# Patient Record
Sex: MALE | Race: WHITE
[De-identification: names, ages, dates, MRNs, and addresses within clinical notes are randomized per-mention and may not be internally consistent; named-entity substitution may affect disease eponyms.]

---

## 2022-07-07 ENCOUNTER — HOSPITAL ENCOUNTER (INPATIENT)
Dept: HOSPITAL 47 - EC | Age: 62
LOS: 8 days | Discharge: HOME | DRG: 834 | End: 2022-07-15
Attending: HOSPITALIST | Admitting: HOSPITALIST
Payer: MEDICARE

## 2022-07-07 VITALS — BODY MASS INDEX: 37.3 KG/M2

## 2022-07-07 DIAGNOSIS — R04.0: ICD-10-CM

## 2022-07-07 DIAGNOSIS — Z79.01: ICD-10-CM

## 2022-07-07 DIAGNOSIS — Z91.81: ICD-10-CM

## 2022-07-07 DIAGNOSIS — Z79.891: ICD-10-CM

## 2022-07-07 DIAGNOSIS — I10: ICD-10-CM

## 2022-07-07 DIAGNOSIS — Z20.822: ICD-10-CM

## 2022-07-07 DIAGNOSIS — Z88.0: ICD-10-CM

## 2022-07-07 DIAGNOSIS — R55: ICD-10-CM

## 2022-07-07 DIAGNOSIS — J44.1: ICD-10-CM

## 2022-07-07 DIAGNOSIS — D51.9: ICD-10-CM

## 2022-07-07 DIAGNOSIS — Z82.0: ICD-10-CM
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
## 2022-07-07 DIAGNOSIS — Z88.8: ICD-10-CM

## 2022-07-07 DIAGNOSIS — R07.89: ICD-10-CM

## 2022-07-07 DIAGNOSIS — R19.5: ICD-10-CM

## 2022-07-07 DIAGNOSIS — Z82.49: ICD-10-CM

## 2022-07-07 DIAGNOSIS — Z86.718: ICD-10-CM

## 2022-07-07 DIAGNOSIS — D75.89: ICD-10-CM

## 2022-07-07 DIAGNOSIS — Z98.1: ICD-10-CM
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
## 2022-07-07 DIAGNOSIS — R60.0: ICD-10-CM

## 2022-07-07 DIAGNOSIS — R53.1: ICD-10-CM

## 2022-07-07 DIAGNOSIS — I49.3: ICD-10-CM

## 2022-07-07 DIAGNOSIS — D53.9: ICD-10-CM

## 2022-07-07 DIAGNOSIS — Z88.6: ICD-10-CM

## 2022-07-07 DIAGNOSIS — I49.1: ICD-10-CM

## 2022-07-07 DIAGNOSIS — Z87.81: ICD-10-CM

## 2022-07-07 DIAGNOSIS — B18.2: ICD-10-CM

## 2022-07-07 DIAGNOSIS — E66.9: ICD-10-CM

## 2022-07-07 DIAGNOSIS — E87.70: ICD-10-CM

## 2022-07-07 DIAGNOSIS — M54.50: ICD-10-CM

## 2022-07-07 DIAGNOSIS — R20.0: ICD-10-CM

## 2022-07-07 DIAGNOSIS — G89.29: ICD-10-CM

## 2022-07-07 DIAGNOSIS — I87.8: ICD-10-CM

## 2022-07-07 DIAGNOSIS — I87.2: ICD-10-CM

## 2022-07-07 DIAGNOSIS — Z79.51: ICD-10-CM

## 2022-07-07 DIAGNOSIS — Z79.899: ICD-10-CM

## 2022-07-07 DIAGNOSIS — D61.818: ICD-10-CM

## 2022-07-07 DIAGNOSIS — C95.00: Primary | ICD-10-CM

## 2022-07-07 DIAGNOSIS — E86.0: ICD-10-CM

## 2022-07-07 DIAGNOSIS — E87.6: ICD-10-CM

## 2022-07-07 DIAGNOSIS — N17.9: ICD-10-CM

## 2022-07-07 DIAGNOSIS — F17.210: ICD-10-CM

## 2022-07-07 DIAGNOSIS — G93.41: ICD-10-CM

## 2022-07-07 DIAGNOSIS — S06.9X1A: ICD-10-CM

## 2022-07-07 DIAGNOSIS — Z95.828: ICD-10-CM

## 2022-07-07 DIAGNOSIS — Z78.1: ICD-10-CM

## 2022-07-07 DIAGNOSIS — K92.2: ICD-10-CM

## 2022-07-07 DIAGNOSIS — I26.99: ICD-10-CM

## 2022-07-07 DIAGNOSIS — K64.9: ICD-10-CM

## 2022-07-07 DIAGNOSIS — R32: ICD-10-CM

## 2022-07-07 DIAGNOSIS — K80.20: ICD-10-CM

## 2022-07-07 DIAGNOSIS — M50.323: ICD-10-CM

## 2022-07-07 DIAGNOSIS — G40.909: ICD-10-CM

## 2022-07-07 LAB
ALBUMIN SERPL-MCNC: 3.4 G/DL (ref 3.5–5)
ALP SERPL-CCNC: 82 U/L (ref 38–126)
ALT SERPL-CCNC: 67 U/L (ref 4–49)
ANION GAP SERPL CALC-SCNC: 4 MMOL/L
APTT BLD: 21.6 SEC (ref 22–30)
AST SERPL-CCNC: 73 U/L (ref 17–59)
BUN SERPL-SCNC: 39 MG/DL (ref 9–20)
CALCIUM SPEC-MCNC: 8.1 MG/DL (ref 8.4–10.2)
CHLORIDE SERPL-SCNC: 107 MMOL/L (ref 98–107)
CO2 SERPL-SCNC: 31 MMOL/L (ref 22–30)
ERYTHROCYTE [DISTWIDTH] IN BLOOD BY AUTOMATED COUNT: 2.23 M/UL (ref 4.3–5.9)
ERYTHROCYTE [DISTWIDTH] IN BLOOD BY AUTOMATED COUNT: 2.25 M/UL (ref 4.3–5.9)
ERYTHROCYTE [DISTWIDTH] IN BLOOD: 16.9 % (ref 11.5–15.5)
ERYTHROCYTE [DISTWIDTH] IN BLOOD: 17 % (ref 11.5–15.5)
GLUCOSE BLD-MCNC: 139 MG/DL (ref 70–110)
GLUCOSE SERPL-MCNC: 113 MG/DL (ref 74–99)
HCT VFR BLD AUTO: 25.9 % (ref 39–53)
HCT VFR BLD AUTO: 25.9 % (ref 39–53)
HGB BLD-MCNC: 8.4 GM/DL (ref 13–17.5)
HGB BLD-MCNC: 8.5 GM/DL (ref 13–17.5)
INR PPP: 1 (ref ?–1.2)
MAGNESIUM SPEC-SCNC: 2.2 MG/DL (ref 1.6–2.3)
MCH RBC QN AUTO: 37.5 PG (ref 25–35)
MCH RBC QN AUTO: 37.5 PG (ref 25–35)
MCHC RBC AUTO-ENTMCNC: 32.4 G/DL (ref 31–37)
MCHC RBC AUTO-ENTMCNC: 32.7 G/DL (ref 31–37)
MCV RBC AUTO: 114.8 FL (ref 80–100)
MCV RBC AUTO: 116 FL (ref 80–100)
POTASSIUM SERPL-SCNC: 2.8 MMOL/L (ref 3.5–5.1)
PROT SERPL-MCNC: 5.7 G/DL (ref 6.3–8.2)
PT BLD: 11.1 SEC (ref 9–12)
SODIUM SERPL-SCNC: 142 MMOL/L (ref 137–145)
WBC # BLD AUTO: 4.4 K/UL (ref 3.8–10.6)
WBC # BLD AUTO: 5 K/UL (ref 3.8–10.6)

## 2022-07-07 PROCEDURE — 71275 CT ANGIOGRAPHY CHEST: CPT

## 2022-07-07 PROCEDURE — 86431 RHEUMATOID FACTOR QUANT: CPT

## 2022-07-07 PROCEDURE — 85384 FIBRINOGEN ACTIVITY: CPT

## 2022-07-07 PROCEDURE — 85610 PROTHROMBIN TIME: CPT

## 2022-07-07 PROCEDURE — 70460 CT HEAD/BRAIN W/DYE: CPT

## 2022-07-07 PROCEDURE — 96366 THER/PROPH/DIAG IV INF ADDON: CPT

## 2022-07-07 PROCEDURE — 71046 X-RAY EXAM CHEST 2 VIEWS: CPT

## 2022-07-07 PROCEDURE — 87522 HEPATITIS C REVRS TRNSCRPJ: CPT

## 2022-07-07 PROCEDURE — 81001 URINALYSIS AUTO W/SCOPE: CPT

## 2022-07-07 PROCEDURE — 83550 IRON BINDING TEST: CPT

## 2022-07-07 PROCEDURE — 96372 THER/PROPH/DIAG INJ SC/IM: CPT

## 2022-07-07 PROCEDURE — 86038 ANTINUCLEAR ANTIBODIES: CPT

## 2022-07-07 PROCEDURE — 72125 CT NECK SPINE W/O DYE: CPT

## 2022-07-07 PROCEDURE — 93970 EXTREMITY STUDY: CPT

## 2022-07-07 PROCEDURE — 85025 COMPLETE CBC W/AUTO DIFF WBC: CPT

## 2022-07-07 PROCEDURE — 76705 ECHO EXAM OF ABDOMEN: CPT

## 2022-07-07 PROCEDURE — 83010 ASSAY OF HAPTOGLOBIN QUANT: CPT

## 2022-07-07 PROCEDURE — 94640 AIRWAY INHALATION TREATMENT: CPT

## 2022-07-07 PROCEDURE — 96376 TX/PRO/DX INJ SAME DRUG ADON: CPT

## 2022-07-07 PROCEDURE — 85045 AUTOMATED RETICULOCYTE COUNT: CPT

## 2022-07-07 PROCEDURE — 36415 COLL VENOUS BLD VENIPUNCTURE: CPT

## 2022-07-07 PROCEDURE — 83880 ASSAY OF NATRIURETIC PEPTIDE: CPT

## 2022-07-07 PROCEDURE — 99285 EMERGENCY DEPT VISIT HI MDM: CPT

## 2022-07-07 PROCEDURE — 94760 N-INVAS EAR/PLS OXIMETRY 1: CPT

## 2022-07-07 PROCEDURE — 96375 TX/PRO/DX INJ NEW DRUG ADDON: CPT

## 2022-07-07 PROCEDURE — 86334 IMMUNOFIX E-PHORESIS SERUM: CPT

## 2022-07-07 PROCEDURE — 95816 EEG AWAKE AND DROWSY: CPT

## 2022-07-07 PROCEDURE — 87521 HEPATITIS C PROBE&RVRS TRNSC: CPT

## 2022-07-07 PROCEDURE — 85730 THROMBOPLASTIN TIME PARTIAL: CPT

## 2022-07-07 PROCEDURE — 96361 HYDRATE IV INFUSION ADD-ON: CPT

## 2022-07-07 PROCEDURE — 83883 ASSAY NEPHELOMETRY NOT SPEC: CPT

## 2022-07-07 PROCEDURE — 82728 ASSAY OF FERRITIN: CPT

## 2022-07-07 PROCEDURE — 82784 ASSAY IGA/IGD/IGG/IGM EACH: CPT

## 2022-07-07 PROCEDURE — 93306 TTE W/DOPPLER COMPLETE: CPT

## 2022-07-07 PROCEDURE — 83540 ASSAY OF IRON: CPT

## 2022-07-07 PROCEDURE — 70450 CT HEAD/BRAIN W/O DYE: CPT

## 2022-07-07 PROCEDURE — 93005 ELECTROCARDIOGRAM TRACING: CPT

## 2022-07-07 PROCEDURE — 84443 ASSAY THYROID STIM HORMONE: CPT

## 2022-07-07 PROCEDURE — 84484 ASSAY OF TROPONIN QUANT: CPT

## 2022-07-07 PROCEDURE — 84165 PROTEIN E-PHORESIS SERUM: CPT

## 2022-07-07 PROCEDURE — 83921 ORGANIC ACID SINGLE QUANT: CPT

## 2022-07-07 PROCEDURE — 87902 NFCT AGT GNTYP ALYS HEP C: CPT

## 2022-07-07 PROCEDURE — 82746 ASSAY OF FOLIC ACID SERUM: CPT

## 2022-07-07 PROCEDURE — 71045 X-RAY EXAM CHEST 1 VIEW: CPT

## 2022-07-07 PROCEDURE — 83615 LACTATE (LD) (LDH) ENZYME: CPT

## 2022-07-07 PROCEDURE — 85379 FIBRIN DEGRADATION QUANT: CPT

## 2022-07-07 PROCEDURE — 36600 WITHDRAWAL OF ARTERIAL BLOOD: CPT

## 2022-07-07 PROCEDURE — 78227 HEPATOBIL SYST IMAGE W/DRUG: CPT

## 2022-07-07 PROCEDURE — 83735 ASSAY OF MAGNESIUM: CPT

## 2022-07-07 PROCEDURE — 87040 BLOOD CULTURE FOR BACTERIA: CPT

## 2022-07-07 PROCEDURE — 87635 SARS-COV-2 COVID-19 AMP PRB: CPT

## 2022-07-07 PROCEDURE — 82805 BLOOD GASES W/O2 SATURATION: CPT

## 2022-07-07 PROCEDURE — 38222 DX BONE MARROW BX & ASPIR: CPT

## 2022-07-07 PROCEDURE — 87390 HIV-1 AG IA: CPT

## 2022-07-07 PROCEDURE — 82747 ASSAY OF FOLIC ACID RBC: CPT

## 2022-07-07 PROCEDURE — 80053 COMPREHEN METABOLIC PANEL: CPT

## 2022-07-07 PROCEDURE — 82607 VITAMIN B-12: CPT

## 2022-07-07 PROCEDURE — 84550 ASSAY OF BLOOD/URIC ACID: CPT

## 2022-07-07 PROCEDURE — 80061 LIPID PANEL: CPT

## 2022-07-07 PROCEDURE — 96365 THER/PROPH/DIAG IV INF INIT: CPT

## 2022-07-07 PROCEDURE — 85652 RBC SED RATE AUTOMATED: CPT

## 2022-07-07 PROCEDURE — 82140 ASSAY OF AMMONIA: CPT

## 2022-07-07 PROCEDURE — 84100 ASSAY OF PHOSPHORUS: CPT

## 2022-07-07 RX ADMIN — POTASSIUM CHLORIDE SCH MLS/HR: 7.46 INJECTION, SOLUTION INTRAVENOUS at 17:40

## 2022-07-07 NOTE — CT
EXAMINATION TYPE: CT brain cspine wo con

 

DATE OF EXAM: 7/7/2022

 

COMPARISON:

 

HISTORY: Multiple symcope episodes. Pt states he hit head.

 

CT DLP: 1606.3 mGycm, Automated exposure control for dose reduction was used.

 

CONTRAST: Patient injected with mL of .

 

CT of the brain is performed utilizing 3 mm thick sections through the posterior fossa and 3 mm thick
 sections through the remaining calvarium.  

 

Study is performed within 24 hours of arrival to the hospital.

 

 No abnormal hyperdensity is present to suggest an acute intracranial hemorrhage.

No mass lesion is evident.

No acute infarcts are evident.

Ventricles and sulci are appropriate for the patient age.  

 

Paranasal sinuses and mastoid air cells within the field-of-view are clear. Right septal deviation is
 noted.

 

IMPRESSIONS:

1. No acute intracranial process. MRI could be performed as clinically indicated.

 

CT cervical spine.

 

COMPARISON: None

 

CT of the cervical spine is performed in the axial plane at 2 mm thick sections.  Reconstructed image
s in the coronal, and sagittal plane are reviewed on the computer. 

 

No acute fractures are evident. Follow-up can be performed as clinically indicated.

Vertebral body alignment is normal.

There is loss of disc height C3-4. Anterior cervical fusion is evident C4-C7. Anterior vertebral body
 spurring is present C7-T1 and C3.

Vertebral body heights are preserved.

No spinal canal stenosis is evident.

No neural foraminal stenosis is evident. 

 

IMPRESSIONS:

1. Degenerative disc changes with anterior cervical fusion.

2. No acute osseous abnormality.

## 2022-07-07 NOTE — CT
EXAMINATION TYPE: CT chest angio for PE

 

DATE OF EXAM: 7/7/2022

 

COMPARISON: None

 

HISTORY: Syncope, elevated troponin

 

CT DLP: 776.6 mGycm

 

CONTRAST: 

CT chest with contrast and 3D reconstruction with MIP imaging is performed with IV Contrast, patient 
injected with 80ml mL of Isovue 370.

 

Contrast-enhanced CT of the chest was performed through the course of the pulmonary arteries with crys
g and mediastinal window settings submitted.  3D reconstruction with MIP imaging was also performed.

 

PULMONARY ARTERIES: There are couple filling defects within 2 right upper lobe pulmonary arterial bra
nches seen best on image 68 of 175 for which I cannot exclude pulmonary embolism. There is also filli
ng defect involving the pulmonary arterial branch seen on image 59 of 175. The remaining pulmonary ar
teries demonstrate appropriate opacification with contrast.

 

LUNGS: There is some upper lobe groundglass opacity which may reflect the acute inflammatory process.
 No evidence for atelectasis.   No pulmonary nodule or mass is detected.  No pleural effusion.  

 

MEDIASTINUM:  Thoracic aorta is of normal caliber,. The heart is not enlarged.  No evidence for media
stinal mass.  No mediastinal lymph nodes greater than 1cm.

 

HILAR STRUCTURES: No evidence for mass.  No hilar lymph nodes greater than 1 cm.

 

UPPER ABDOMEN:  No significant abnormality is seen.

 

IMPRESSION:

1.  Small pulmonary emboli cannot be excluded.

2. Groundglass upper lobe density may reflect acute inflammatory process.

## 2022-07-07 NOTE — P.HPIM
History of Present Illness


H&P Date: 07/07/22





The patient is a 62-year-old male with a PMH of hypertension, chronic anemia, 

COPD, chronic lower extremity edema, history of DVT on Eliquis, who presents to 

the emergency room for multiple episodes of loss of consciousness.  The patient 

reports that he had been in his usual state of health until about 5 days ago 

when he began losing consciousness without any clear warning.  Reports it 

happened a total of 4 times in the past 5 days.  Each time, he would lose 

consciousness while sitting down or standing up, without any prodromal symptoms.

 He reports waking up on the floor each time, informed by bystanders that he was

confused and having urinary incontinence during 2 of these episodes.  He also 

reports biting his tongue during the episode earlier today.  Patient reports 

that today, he was with his friends in town and they had gone into a store, at 

which point he decided to go to the store as well, and as he got out of the car,

he was walking in the parking lot and the next thing he knows he was on the 

ground with people surrounding him.  He reports pain at the back of his head 

from the fall.  Does endorse experiencing some chest discomfort and shortness of

breath, left-sided, nonradiating shortly after regaining consciousness, lasting 

for a few minutes.  No reported shaking movements of the limbs.  He denies any 

prior history of such symptoms.  Reports that the symptoms are not positional, 

and he has no dizziness or lightheadedness upon standing.  Denies any changes in

his appetite recently.  Denied fever, chills, abdominal pain, diarrhea.  Reports

chronic nonproductive cough which he attributes to his COPD.  Head and cervical 

spine CT was unremarkable.  Chest CTA revealed groundglass upper lobe density, 

possible acute inflammatory process.  EKG revealed a junctional rhythm at 85 bpm

with poor R-wave progression.  Laboratory evaluation was remarkable for 

hemoglobin 8.5, .8, platelet count 74, d-dimer 3.9, potassium 2.8, CO2 

31, BUN 39, creatinine 1.24, troponin less than 0.012.





Review of systems:


Pertinent positives and negatives as discussed in HPI, a complete review of 

systems was performed and all other systems are negative.





Physical examination:


General: non toxic, no distress, appears at stated age, obese


Derm: no unusual rashes/lesions, warm


Head: atraumatic, normocephalic, symmetric


Eyes: EOMI, no lid lag, anicteric sclera, pupils equal round reactive to light


ENT: Nose and ears atraumatic


Neck: No cervical lymphadenopathy, trachea midline, supple


Mouth: no lip lesion, mucus membranes moist, bilateral tongue bites noted


Cardiovascular: S1S2 reg, no murmur, positive dorsalis pedis pulse bilateral, 2+

bilateral lower extremity pitting edema with right lower extremity chronic 

venous stasis changes


Lungs: Diffuse expiratory wheezing, no accessory muscle use


Abdominal: soft,  nontender to palpation, no guarding


Ext: muscle strength 5 out of 5 in all 4 extremities grossly, no gross muscle 

atrophy, no contractures, 


Neuro:  CN II-XI grossly intact, no gross focal neuro deficits


Psych: Alert, oriented, appropriate affect 





Assessment/plan





Syncope, seizure versus cardiogenic


-Seizure and fall precautions


-Obtain orthostatics


-Continue cardiac monitoring


-Neurology and cardiology consults


-Echocardiogram





Mild acute COPD exacerbation


-DuoNeb's


-Oral prednisone





Hypokalemia


-Replace and monitor





Macrocytic anemia


-Patient reports he has a history of anemia


-Check B12 and folate levels





Thrombocytopenia


-No baseline available for comparison





Acute kidney injury, prerenal


-Unclear etiology


-Judicious use of IV fluids in setting of significant lower extremity edema


-Monitor BMP





DVT prophylaxis


-Heparin subcu





The patient is admitted with an anticipated less than 2 midnight stay for 

evaluation of syncope.


CODE STATUS: Full Code


Discussed with: Patient


Anticipated discharge date: in am


Anticipated discharge place: Home











Past Medical History


Past Medical History: Blood Disorder, COPD, Hypertension


Additional Past Medical History / Comment(s): chronic back pain


History of Any Multi-Drug Resistant Organisms: None Reported


Past Surgical History: Back Surgery, Orthopedic Surgery


Past Anesthesia/Blood Transfusion Reactions: No Reported Reaction


Smoking Status: Current every day smoker


Past Alcohol Use History: None Reported


Past Drug Use History: None Reported





- Past Family History


  ** Mother


Family Medical History: Hypertension





Medications and Allergies


                                Home Medications











 Medication  Instructions  Recorded  Confirmed  Type


 


ALPRAZolam [Xanax] 1 mg PO TID 07/07/22 07/07/22 History


 


Albuterol Inhaler [Ventolin Hfa 2 puff INHALATION RT-QID PRN 07/07/22 07/07/22 

History





Inhaler]    


 


Apixaban [Eliquis] 5 mg PO BID 07/07/22 07/07/22 History


 


Baclofen 10 mg PO BID 07/07/22 07/07/22 History


 


Fluticasone/Umeclidin/Vilanter 1 puff INHALATION RT-DAILY 07/07/22 07/07/22 

History





[Trelegy Ellipta 100-62.5-25]    


 


Furosemide [Lasix] 20 mg PO HS 07/07/22 07/07/22 History


 


Furosemide [Lasix] 40 mg PO DAILY 07/07/22 07/07/22 History


 


Mirtazapine 30 mg PO HS 07/07/22 07/07/22 History


 


Montelukast [Singulair] 10 mg PO HS 07/07/22 07/07/22 History


 


Mupirocin 2% Oint [Bactroban 2% 1 applic TOPICAL DAILY 07/07/22 07/07/22 History





Oint]    


 


Naloxone HCl [Narcan] 4 mg NASAL ONCE PRN 07/07/22 07/07/22 History


 


Pantoprazole [Protonix] 40 mg PO DAILY 07/07/22 07/07/22 History


 


Potassium Chloride ER [K-Dur 20] 20 meq PO DAILY 07/07/22 07/07/22 History


 


Pregabalin [Lyrica] 150 mg PO BID 07/07/22 07/07/22 History


 


Promethazine HCl [Phenergan Syrup] 6.25 mg PO Q6H PRN 07/07/22 07/07/22 History


 


hydroCHLOROthiazide [Hydrodiuril] 25 mg PO DAILY 07/07/22 07/07/22 History


 


lisinopriL [Prinivil] 20 mg PO DAILY 07/07/22 07/07/22 History


 


oxyCODONE-APAP 10-325MG [Percocet 1 tab PO TID 07/07/22 07/07/22 History





 mg]    








                                    Allergies











Allergy/AdvReac Type Severity Reaction Status Date / Time


 


Penicillins Allergy  Anaphylaxis Verified 07/07/22 18:15


 


tamsulosin [From Flomax] Allergy  Rash/Hives Verified 07/07/22 17:40


 


ibuprofen AdvReac  Nausea Verified 07/07/22 17:40














Physical Exam


Vitals: 


                                   Vital Signs











  Temp Pulse Pulse Pulse Resp BP BP


 


 07/07/22 22:19  97.8 F   72  75  18   133/64


 


 07/07/22 19:20  97.8 F  78    18  139/72 


 


 07/07/22 17:09   75    20  137/71 


 


 07/07/22 16:11    81   18   124/90


 


 07/07/22 16:06   81    18  124/57 


 


 07/07/22 14:31   83  86   18  126/70 


 


 07/07/22 14:00  98.4 F  87    18  131/64 














  BP BP Pulse Ox


 


 07/07/22 22:19    98


 


 07/07/22 19:20    95


 


 07/07/22 17:09    98


 


 07/07/22 16:11  130/59  116/69 


 


 07/07/22 16:06   


 


 07/07/22 14:31    94 L


 


 07/07/22 14:00    95








                                Intake and Output











 07/07/22 07/07/22 07/07/22





 06:59 14:59 22:59


 


Other:   


 


  Weight  121.563 kg 121.563 kg














Results


CBC & Chem 7: 


                                 07/07/22 23:00





                                 07/07/22 15:58


Labs: 


                  Abnormal Lab Results - Last 24 Hours (Table)











  07/07/22 07/07/22 07/07/22 Range/Units





  14:48 14:48 15:58 


 


RBC  2.25 L    (4.30-5.90)  m/uL


 


Hgb  8.5 L    (13.0-17.5)  gm/dL


 


Hct  25.9 L    (39.0-53.0)  %


 


MCV  114.8 H    (80.0-100.0)  fL


 


MCH  37.5 H    (25.0-35.0)  pg


 


RDW  17.0 H    (11.5-15.5)  %


 


Plt Count  74 L    (150-450)  k/uL


 


Macrocytosis  Marked A    


 


APTT   21.6 L   (22.0-30.0)  sec


 


D-Dimer   3.90 H   (<0.60)  mg/L FEU


 


Potassium    2.8 L  (3.5-5.1)  mmol/L


 


Carbon Dioxide    31 H  (22-30)  mmol/L


 


BUN    39 H  (9-20)  mg/dL


 


Glucose    113 H  (74-99)  mg/dL


 


POC Glucose (mg/dL)     ()  mg/dL


 


Calcium    8.1 L  (8.4-10.2)  mg/dL


 


AST    73 H  (17-59)  U/L


 


ALT    67 H  (4-49)  U/L


 


Total Protein    5.7 L  (6.3-8.2)  g/dL


 


Albumin    3.4 L  (3.5-5.0)  g/dL














  07/07/22 Range/Units





  16:10 


 


RBC   (4.30-5.90)  m/uL


 


Hgb   (13.0-17.5)  gm/dL


 


Hct   (39.0-53.0)  %


 


MCV   (80.0-100.0)  fL


 


MCH   (25.0-35.0)  pg


 


RDW   (11.5-15.5)  %


 


Plt Count   (150-450)  k/uL


 


Macrocytosis   


 


APTT   (22.0-30.0)  sec


 


D-Dimer   (<0.60)  mg/L FEU


 


Potassium   (3.5-5.1)  mmol/L


 


Carbon Dioxide   (22-30)  mmol/L


 


BUN   (9-20)  mg/dL


 


Glucose   (74-99)  mg/dL


 


POC Glucose (mg/dL)  139 H  ()  mg/dL


 


Calcium   (8.4-10.2)  mg/dL


 


AST   (17-59)  U/L


 


ALT   (4-49)  U/L


 


Total Protein   (6.3-8.2)  g/dL


 


Albumin   (3.5-5.0)  g/dL














Thrombosis Risk Factor Assmnt





- Choose All That Apply


Each Factor Represents 1 point: Age 41-60 years, Obesity (BMI >25), Swollen legs

(current)


Thrombosis Risk Factor Assessment Total Risk Factor Score: 3


Thrombosis Risk Factor Assessment Level: Moderate Risk

## 2022-07-07 NOTE — XR
EXAMINATION TYPE: XR chest 2V

 

DATE OF EXAM: 7/7/2022

 

COMPARISON: NONE

 

HISTORY: Sick of the

 

TECHNIQUE:  Frontal and lateral views of the chest are obtained.

 

FINDINGS:  There is no focal air space opacity, pleural effusion, or pneumothorax seen.  The cardiac 
silhouette size is within normal limits.   The osseous structures are intact, there is thoracic spond
ylosis, postop changes are noted to the cervical spine. Arthropathy noted at the acromioclavicular elie
ints, callus formation suspected the left seventh rib, correlate for history of fracture, rib healing
.

 

IMPRESSION:  No acute cardiopulmonary process. Additional findings above.

## 2022-07-08 LAB
ALBUMIN SERPL-MCNC: 3.2 G/DL (ref 3.5–5)
ALP SERPL-CCNC: 61 U/L (ref 38–126)
ALT SERPL-CCNC: 62 U/L (ref 4–49)
ANION GAP SERPL CALC-SCNC: 6 MMOL/L
AST SERPL-CCNC: 73 U/L (ref 17–59)
BLASTS # BLD MANUAL: 0.18 K/UL
BLASTS # BLD MANUAL: 0.45 K/UL
BLASTS # BLD MANUAL: 0.62 K/UL
BUN SERPL-SCNC: 39 MG/DL (ref 9–20)
CALCIUM SPEC-MCNC: 8 MG/DL (ref 8.4–10.2)
CELLS COUNTED: 200
CHLORIDE SERPL-SCNC: 108 MMOL/L (ref 98–107)
CHOLEST SERPL-MCNC: 121 MG/DL (ref 0–200)
CO2 SERPL-SCNC: 26 MMOL/L (ref 22–30)
ERYTHROCYTE [DISTWIDTH] IN BLOOD BY AUTOMATED COUNT: 2.17 M/UL (ref 4.3–5.9)
ERYTHROCYTE [DISTWIDTH] IN BLOOD: 17.6 % (ref 11.5–15.5)
FERRITIN SERPL-MCNC: 50.2 NG/ML (ref 22–322)
GLUCOSE SERPL-MCNC: 120 MG/DL (ref 74–99)
HCT VFR BLD AUTO: 25.6 % (ref 39–53)
HDLC SERPL-MCNC: 32.9 MG/DL (ref 40–60)
HGB BLD-MCNC: 8.1 GM/DL (ref 13–17.5)
IGA SERPL-MCNC: 82.6 MG/DL (ref 60–350)
IGG SERPL-MCNC: 598 MG/DL (ref 700–1600)
IGM SERPL-MCNC: 157 MG/DL (ref 40–280)
IRON SERPL-MCNC: 55 UG/DL (ref 65–175)
LDH SPEC-CCNC: 1042 U/L (ref 313–618)
LDLC SERPL CALC-MCNC: 74.6 MG/DL (ref 0–131)
LYMPHOCYTES # BLD MANUAL: 0.57 K/UL (ref 1–4.8)
LYMPHOCYTES # BLD MANUAL: 0.75 K/UL (ref 1–4.8)
LYMPHOCYTES # BLD MANUAL: 0.84 K/UL (ref 1–4.8)
MAGNESIUM SPEC-SCNC: 2.3 MG/DL (ref 1.6–2.3)
MCH RBC QN AUTO: 37.1 PG (ref 25–35)
MCHC RBC AUTO-ENTMCNC: 31.5 G/DL (ref 31–37)
MCV RBC AUTO: 117.8 FL (ref 80–100)
MONOCYTES # BLD MANUAL: 0.3 K/UL (ref 0–1)
MONOCYTES # BLD MANUAL: 0.35 K/UL (ref 0–1)
MONOCYTES # BLD MANUAL: 0.53 K/UL (ref 0–1)
MYELOCYTES # BLD MANUAL: 0.09 K/UL
NEUTROPHILS NFR BLD MANUAL: 63 %
NEUTROPHILS NFR BLD MANUAL: 65 %
NEUTROPHILS NFR BLD MANUAL: 66 %
NEUTS SEG # BLD MANUAL: 2.86 K/UL (ref 1.3–7.7)
NEUTS SEG # BLD MANUAL: 2.9 K/UL (ref 1.3–7.7)
NEUTS SEG # BLD MANUAL: 3.6 K/UL (ref 1.3–7.7)
PLATELET # BLD AUTO: 72 K/UL (ref 150–450)
PLATELET # BLD AUTO: 74 K/UL (ref 150–450)
PLATELET # BLD AUTO: 79 K/UL (ref 150–450)
POTASSIUM SERPL-SCNC: 3.4 MMOL/L (ref 3.5–5.1)
PROT SERPL-MCNC: 5.7 G/DL (ref 6.3–8.2)
RHEUMATOID FACT SERPL-ACNC: <10 IU/ML (ref 0–15)
SODIUM SERPL-SCNC: 140 MMOL/L (ref 137–145)
TIBC SERPL-MCNC: 398 UG/DL (ref 228–460)
TRIGL SERPL-MCNC: 67.3 MG/DL (ref 0–149)
URATE SERPL-MCNC: 7.9 MG/DL (ref 3.5–8.5)
VLDLC SERPL CALC-MCNC: 13.46 MG/DL (ref 5–40)
WBC # BLD AUTO: 4.4 K/UL (ref 3.8–10.6)

## 2022-07-08 RX ADMIN — PREGABALIN SCH MG: 75 CAPSULE ORAL at 09:11

## 2022-07-08 RX ADMIN — IPRATROPIUM BROMIDE AND ALBUTEROL SULFATE SCH: .5; 3 SOLUTION RESPIRATORY (INHALATION) at 16:25

## 2022-07-08 RX ADMIN — BACLOFEN SCH MG: 10 TABLET ORAL at 09:11

## 2022-07-08 RX ADMIN — BUDESONIDE AND FORMOTEROL FUMARATE DIHYDRATE SCH: 80; 4.5 AEROSOL RESPIRATORY (INHALATION) at 07:29

## 2022-07-08 RX ADMIN — OXYCODONE HYDROCHLORIDE AND ACETAMINOPHEN SCH: 10; 325 TABLET ORAL at 09:17

## 2022-07-08 RX ADMIN — PANTOPRAZOLE SODIUM SCH MG: 40 INJECTION, POWDER, FOR SOLUTION INTRAVENOUS at 09:12

## 2022-07-08 RX ADMIN — POTASSIUM CHLORIDE SCH MEQ: 20 TABLET, EXTENDED RELEASE ORAL at 09:11

## 2022-07-08 RX ADMIN — OXYCODONE HYDROCHLORIDE AND ACETAMINOPHEN SCH EACH: 10; 325 TABLET ORAL at 17:12

## 2022-07-08 RX ADMIN — IPRATROPIUM BROMIDE AND ALBUTEROL SULFATE SCH: .5; 3 SOLUTION RESPIRATORY (INHALATION) at 10:50

## 2022-07-08 RX ADMIN — MONTELUKAST SODIUM SCH MG: 10 TABLET, FILM COATED ORAL at 21:05

## 2022-07-08 RX ADMIN — OXYCODONE HYDROCHLORIDE AND ACETAMINOPHEN SCH EACH: 10; 325 TABLET ORAL at 21:04

## 2022-07-08 RX ADMIN — PREGABALIN SCH MG: 75 CAPSULE ORAL at 21:05

## 2022-07-08 RX ADMIN — IPRATROPIUM BROMIDE AND ALBUTEROL SULFATE SCH: .5; 3 SOLUTION RESPIRATORY (INHALATION) at 07:29

## 2022-07-08 RX ADMIN — PANTOPRAZOLE SODIUM SCH MG: 40 INJECTION, POWDER, FOR SOLUTION INTRAVENOUS at 21:07

## 2022-07-08 RX ADMIN — BUDESONIDE AND FORMOTEROL FUMARATE DIHYDRATE SCH: 80; 4.5 AEROSOL RESPIRATORY (INHALATION) at 20:11

## 2022-07-08 RX ADMIN — POTASSIUM CHLORIDE SCH MLS/HR: 7.46 INJECTION, SOLUTION INTRAVENOUS at 01:22

## 2022-07-08 RX ADMIN — CEFAZOLIN SCH: 330 INJECTION, POWDER, FOR SOLUTION INTRAMUSCULAR; INTRAVENOUS at 02:52

## 2022-07-08 RX ADMIN — IPRATROPIUM BROMIDE AND ALBUTEROL SULFATE SCH: .5; 3 SOLUTION RESPIRATORY (INHALATION) at 20:11

## 2022-07-08 RX ADMIN — OXYCODONE HYDROCHLORIDE AND ACETAMINOPHEN SCH EACH: 10; 325 TABLET ORAL at 09:11

## 2022-07-08 RX ADMIN — BACLOFEN SCH MG: 10 TABLET ORAL at 21:05

## 2022-07-08 RX ADMIN — ACETAMINOPHEN PRN MG: 325 TABLET, FILM COATED ORAL at 01:20

## 2022-07-08 NOTE — EEG
ELECTROENCEPHALOGRAM REPORT



DATE OF SERVICE:

07/07/2022



CLINICAL HISTORY:

This is a 62-year-old gentleman with repeated syncopal episodes.  The video EEG 
is

obtained to evaluate for seizure epileptiform activity.



RELEVANT MEDICATION:

The patient is not on any antiepileptic drug.



EEG TYPE:

This is a routine 21 channel EEG performed with video using the 10/20 electrode

placement system.



DESCRIPTION:

Wakefulness and drowsiness are obtained.  During awake state, the posterior-
dominant

rhythm consists of low to moderate voltage of 8-9 hertz activity.  At times, the

background consists of diffuse delta as well as theta activity.  There was no

physiological stage 2 sleep architecture seen.



There is no focal slowing.



Interictal and ictal:  There is questionable rare sharp and slow waves and 
appears over

the left frontal temporal central region.  There is no seizure that was 
appreciated

during this routine EEG.



Photic stimulation did not evoke a posterior driving response.  There is no 
abnormality

during the photic stimulation.



Hyperventilation is not performed.



CLINICAL INTERPRETATION:

This is an abnormal routine EEG.  The background slowing is suggestive of mild

encephalopathy.  There are questionable rare fronto-temporal-central 
epileptiform

discharges.  Otherwise, there is no seizure or focal slowing detected on this 
study.

Clinical correlation is recommended.



Recommend prolonged 2.5 hour EEG and that can be performed as an outpatient.  
Clinical

correlation is recommended.





MMODL / IJN: [id number] / Job#: [id number]

[id number]

## 2022-07-08 NOTE — P.GSHP
History of Present Illness


H&P Date: 07/08/22





CHIEF COMPLAINT: Dizziness with syncope





HISTORY OF PRESENT ILLNESS: This is a 62-year-old male who presented to the 

hospital after having a syncopal episode.  Patient reports 4 syncopal episodes 

over the last week.  Patient reports that usually occurs when he goes from a 

sitting to standing position.  He's been feeling dizzy and lightheaded.  He also

has reported having intermittent black stools for the last month.  He is on 

Eliquis at home for a DVT in the leg.  He has been taking this medication for 

over a year.  He does report mid abdominal pain above the umbilicus.  This pain 

has been intermittent.  Denies any nausea or vomiting.  No prior history of 

colonoscopy or EGD.  Denies any NSAID use.  Hemoglobin on admission 8.5 down to 

8.1.  He is being followed by cardiology and neurology service regarding the 

syncope.  CTA of the chest had shown concerns about a possible small PE.  Neuro

logy had reported concerns for possible seizure due to urinary incontinence and 

tongue biting during these syncopal episodes and they started patient on Keppra.





PAST MEDICAL HISTORY: 


Hypertension, COPD, DVT





PAST SURGICAL HISTORY: 


See list.





MEDICATIONS: 


See list.





ALLERGIES: 


See list.





SOCIAL HISTORY: No illicit drug use.  





REVIEW OF SYSTEMS: 


CONSTITUTIONAL: Denies fever or chills.


HEENT: Denies blurred vision, vision changes, or eye pain. Denies hemoptysis 


CARDIOVASCULAR: Denies chest pain or pressure.


RESPIRATORY: No shortness of breath. 


GASTROINTESTINAL: See HPI for pertinent findings


HEMATOLOGIC: Denies bleeding disorders.


GENITOURINARY:  Denies any blood in urine or increased urinary frequency.  


SKIN: Denies pruitis. Denies rash.





PHYSICAL EXAM: 


VITAL SIGNS: Reviewed


GENERAL: Well-developed in no acute distress. 


HEENT:  No sclera icterus. Extraocular movements grossly intact.  Moist buccal 

mucosa. Head is atraumatic, normocephalic. No nasal drainage.


ABDOMEN:  Soft.  Mildly distended.  Tenderness to palpation of mid abdomen above

the umbilicus


NEUROLOGIC:  Alert and oriented.  Cranial nerves II through XII grossly intact.





LABORATORY DATA:


WBC 4.4 Hgb 8.1 platelets pending


D-dimer 3.90


Sodium 140 potassium is up from 2.8-3.4 creatinine 0.83


AST 73 ALT 62





IMAGING:


Computed tomography scan of the brain and cervical spine no acute intracranial 

process.  Degenerative disc changes with anterior cervical fusion.  No acute 

osseous abnormality.


Chest CTA small pulmonary emboli cannot be excluded.  Round glass upper lobe 

density may reflect acute inflammatory process


Venous Doppler negative for DVT bilaterally





ASSESSMENT: 


1.  Macrocytic Anemia with black stools


2.  Multiple Syncopal episodes


3.  Possible seizure


4.  Possible small PE noted on CTA


5.  History of DVT on Eliquis


6.  Hypokalemia





PLAN: 


-Patient scheduled for EGD and colonoscopy on Monday, 07/11/2022 with Dr. Green


-Keep Eliquis on hold


-Continue PPI


-Continue to monitor for signs and symptoms of bleeding


-Continue to monitor hemoglobin


-Checking stool for occult blood


-Continue cardiology and neurology workup





Thank you for this consultation





Physician Assistant note has been reviewed by physician. Signing provider agrees

with the documented findings, assessment, and plan of care. 





Past Medical History


Past Medical History: Blood Disorder, COPD, Hypertension


Additional Past Medical History / Comment(s): chronic back pain


History of Any Multi-Drug Resistant Organisms: None Reported


Past Surgical History: Back Surgery, Orthopedic Surgery


Past Anesthesia/Blood Transfusion Reactions: No Reported Reaction


Smoking Status: Current every day smoker


Past Alcohol Use History: None Reported


Past Drug Use History: None Reported





- Past Family History


  ** Mother


Family Medical History: Hypertension





Medications and Allergies


                                Home Medications











 Medication  Instructions  Recorded  Confirmed  Type


 


ALPRAZolam [Xanax] 1 mg PO TID 07/07/22 07/07/22 History


 


Albuterol Inhaler [Ventolin Hfa 2 puff INHALATION RT-QID PRN 07/07/22 07/07/22 

History





Inhaler]    


 


Apixaban [Eliquis] 5 mg PO BID 07/07/22 07/07/22 History


 


Baclofen 10 mg PO BID 07/07/22 07/07/22 History


 


Fluticasone/Umeclidin/Vilanter 1 puff INHALATION RT-DAILY 07/07/22 07/07/22 

History





[Trelegy Ellipta 100-62.5-25]    


 


Furosemide [Lasix] 20 mg PO HS 07/07/22 07/07/22 History


 


Furosemide [Lasix] 40 mg PO DAILY 07/07/22 07/07/22 History


 


Mirtazapine 30 mg PO HS 07/07/22 07/07/22 History


 


Montelukast [Singulair] 10 mg PO HS 07/07/22 07/07/22 History


 


Mupirocin 2% Oint [Bactroban 2% 1 applic TOPICAL DAILY 07/07/22 07/07/22 History





Oint]    


 


Naloxone HCl [Narcan] 4 mg NASAL ONCE PRN 07/07/22 07/07/22 History


 


Pantoprazole [Protonix] 40 mg PO DAILY 07/07/22 07/07/22 History


 


Potassium Chloride ER [K-Dur 20] 20 meq PO DAILY 07/07/22 07/07/22 History


 


Pregabalin [Lyrica] 150 mg PO BID 07/07/22 07/07/22 History


 


Promethazine HCl [Phenergan Syrup] 6.25 mg PO Q6H PRN 07/07/22 07/07/22 History


 


hydroCHLOROthiazide [Hydrodiuril] 25 mg PO DAILY 07/07/22 07/07/22 History


 


lisinopriL [Prinivil] 20 mg PO DAILY 07/07/22 07/07/22 History


 


oxyCODONE-APAP 10-325MG [Percocet 1 tab PO TID 07/07/22 07/07/22 History





 mg]    








                                    Allergies











Allergy/AdvReac Type Severity Reaction Status Date / Time


 


Penicillins Allergy  Anaphylaxis Verified 07/07/22 18:15


 


tamsulosin [From Flomax] Allergy  Rash/Hives Verified 07/07/22 17:40


 


ibuprofen AdvReac  Nausea Verified 07/07/22 17:40














Surgical - Exam


                                   Vital Signs











Temp Pulse Resp BP Pulse Ox


 


 98.4 F   87   18   131/64   95 


 


 07/07/22 14:00  07/07/22 14:00  07/07/22 14:00  07/07/22 14:00  07/07/22 14:00














Results





- Labs





                                 07/08/22 02:13





                                 07/08/22 07:53


                  Abnormal Lab Results - Last 24 Hours (Table)











  07/07/22 07/07/22 07/07/22 Range/Units





  14:48 14:48 15:58 


 


RBC  2.25 L    (4.30-5.90)  m/uL


 


Hgb  8.5 L    (13.0-17.5)  gm/dL


 


Hct  25.9 L    (39.0-53.0)  %


 


MCV  114.8 H    (80.0-100.0)  fL


 


MCH  37.5 H    (25.0-35.0)  pg


 


RDW  17.0 H    (11.5-15.5)  %


 


Plt Count  74 L    (150-450)  k/uL


 


Blast Cells %  14 H*    %


 


Lymphocytes # (Manual)  0.57 L    (1.0-4.8)  k/uL


 


Myelocytes # (Manual)  0.09 H    (0)  k/uL


 


Blast Cells # (Man)  0.62 H    (0)  k/uL


 


Nucleated RBCs  1 H    (0-0)  /100 WBC


 


Macrocytosis  Marked A    


 


APTT   21.6 L   (22.0-30.0)  sec


 


D-Dimer   3.90 H   (<0.60)  mg/L FEU


 


Potassium    2.8 L  (3.5-5.1)  mmol/L


 


Chloride     ()  mmol/L


 


Carbon Dioxide    31 H  (22-30)  mmol/L


 


BUN    39 H  (9-20)  mg/dL


 


Glucose    113 H  (74-99)  mg/dL


 


POC Glucose (mg/dL)     ()  mg/dL


 


Calcium    8.1 L  (8.4-10.2)  mg/dL


 


AST    73 H  (17-59)  U/L


 


ALT    67 H  (4-49)  U/L


 


Total Protein    5.7 L  (6.3-8.2)  g/dL


 


Albumin    3.4 L  (3.5-5.0)  g/dL














  07/07/22 07/07/22 07/08/22 Range/Units





  16:10 23:00 02:13 


 


RBC   2.23 L  2.17 L  (4.30-5.90)  m/uL


 


Hgb   8.4 L  8.1 L  (13.0-17.5)  gm/dL


 


Hct   25.9 L  25.6 L  (39.0-53.0)  %


 


MCV   116.0 H  117.8 H  (80.0-100.0)  fL


 


MCH   37.5 H  37.1 H  (25.0-35.0)  pg


 


RDW   16.9 H  17.6 H  (11.5-15.5)  %


 


Plt Count     (150-450)  k/uL


 


Blast Cells %     %


 


Lymphocytes # (Manual)     (1.0-4.8)  k/uL


 


Myelocytes # (Manual)     (0)  k/uL


 


Blast Cells # (Man)     (0)  k/uL


 


Nucleated RBCs     (0-0)  /100 WBC


 


Macrocytosis   Marked A  Marked A  


 


APTT     (22.0-30.0)  sec


 


D-Dimer     (<0.60)  mg/L FEU


 


Potassium     (3.5-5.1)  mmol/L


 


Chloride     ()  mmol/L


 


Carbon Dioxide     (22-30)  mmol/L


 


BUN     (9-20)  mg/dL


 


Glucose     (74-99)  mg/dL


 


POC Glucose (mg/dL)  139 H    ()  mg/dL


 


Calcium     (8.4-10.2)  mg/dL


 


AST     (17-59)  U/L


 


ALT     (4-49)  U/L


 


Total Protein     (6.3-8.2)  g/dL


 


Albumin     (3.5-5.0)  g/dL














  07/08/22 Range/Units





  07:53 


 


RBC   (4.30-5.90)  m/uL


 


Hgb   (13.0-17.5)  gm/dL


 


Hct   (39.0-53.0)  %


 


MCV   (80.0-100.0)  fL


 


MCH   (25.0-35.0)  pg


 


RDW   (11.5-15.5)  %


 


Plt Count   (150-450)  k/uL


 


Blast Cells %   %


 


Lymphocytes # (Manual)   (1.0-4.8)  k/uL


 


Myelocytes # (Manual)   (0)  k/uL


 


Blast Cells # (Man)   (0)  k/uL


 


Nucleated RBCs   (0-0)  /100 WBC


 


Macrocytosis   


 


APTT   (22.0-30.0)  sec


 


D-Dimer   (<0.60)  mg/L FEU


 


Potassium  3.4 L  (3.5-5.1)  mmol/L


 


Chloride  108 H  ()  mmol/L


 


Carbon Dioxide   (22-30)  mmol/L


 


BUN  39 H  (9-20)  mg/dL


 


Glucose  120 H  (74-99)  mg/dL


 


POC Glucose (mg/dL)   ()  mg/dL


 


Calcium  8.0 L  (8.4-10.2)  mg/dL


 


AST  73 H  (17-59)  U/L


 


ALT  62 H  (4-49)  U/L


 


Total Protein  5.7 L  (6.3-8.2)  g/dL


 


Albumin  3.2 L  (3.5-5.0)  g/dL








                                 Diabetes panel











  07/07/22 07/08/22 Range/Units





  15:58 07:53 


 


Sodium  142  140  (137-145)  mmol/L


 


Potassium  2.8 L  3.4 L  (3.5-5.1)  mmol/L


 


Chloride  107  108 H  ()  mmol/L


 


Carbon Dioxide  31 H  26  (22-30)  mmol/L


 


BUN  39 H  39 H  (9-20)  mg/dL


 


Creatinine  1.24  0.83  (0.66-1.25)  mg/dL


 


Glucose  113 H  120 H  (74-99)  mg/dL


 


Calcium  8.1 L  8.0 L  (8.4-10.2)  mg/dL


 


AST  73 H  73 H  (17-59)  U/L


 


ALT  67 H  62 H  (4-49)  U/L


 


Alkaline Phosphatase  82  61  ()  U/L


 


Total Protein  5.7 L  5.7 L  (6.3-8.2)  g/dL


 


Albumin  3.4 L  3.2 L  (3.5-5.0)  g/dL








                                  Calcium panel











  07/07/22 07/08/22 Range/Units





  15:58 07:53 


 


Calcium  8.1 L  8.0 L  (8.4-10.2)  mg/dL


 


Albumin  3.4 L  3.2 L  (3.5-5.0)  g/dL








                                 Pituitary panel











  07/07/22 07/08/22 Range/Units





  15:58 07:53 


 


Sodium  142  140  (137-145)  mmol/L


 


Potassium  2.8 L  3.4 L  (3.5-5.1)  mmol/L


 


Chloride  107  108 H  ()  mmol/L


 


Carbon Dioxide  31 H  26  (22-30)  mmol/L


 


BUN  39 H  39 H  (9-20)  mg/dL


 


Creatinine  1.24  0.83  (0.66-1.25)  mg/dL


 


Glucose  113 H  120 H  (74-99)  mg/dL


 


Calcium  8.1 L  8.0 L  (8.4-10.2)  mg/dL








                                  Adrenal panel











  07/07/22 07/08/22 Range/Units





  15:58 07:53 


 


Sodium  142  140  (137-145)  mmol/L


 


Potassium  2.8 L  3.4 L  (3.5-5.1)  mmol/L


 


Chloride  107  108 H  ()  mmol/L


 


Carbon Dioxide  31 H  26  (22-30)  mmol/L


 


BUN  39 H  39 H  (9-20)  mg/dL


 


Creatinine  1.24  0.83  (0.66-1.25)  mg/dL


 


Glucose  113 H  120 H  (74-99)  mg/dL


 


Calcium  8.1 L  8.0 L  (8.4-10.2)  mg/dL


 


Total Bilirubin  0.2  0.4  (0.2-1.3)  mg/dL


 


AST  73 H  73 H  (17-59)  U/L


 


ALT  67 H  62 H  (4-49)  U/L


 


Alkaline Phosphatase  82  61  ()  U/L


 


Total Protein  5.7 L  5.7 L  (6.3-8.2)  g/dL


 


Albumin  3.4 L  3.2 L  (3.5-5.0)  g/dL

## 2022-07-08 NOTE — US
EXAMINATION TYPE: US liver

 

DATE OF EXAM: 7/8/2022

 

COMPARISON: NONE

 

CLINICAL HISTORY: cirrhosis. distended abd with pain

 

EXAM MEASUREMENTS:

 

Liver Length:  16.9cm   

Gallbladder Wall:  0.4 cm   

CBD:  0.4 cm

Right Kidney:  8.9 x 3.5 x 5.1 cm

 

**bowel gas limits exam

 

Pancreas:  portions seen wnl

Liver:  difficult to penetrate, nodular appearance with target sign lesion noted within right posteri
or lobe = 2.9 x 2.4 x 2.9cm   

Gallbladder:  1.0cm stone with mildly thickened wall

**Evidence for sonographic Hitchcock's sign:  hurt everywhere

CBD:  wnl 

Right Kidney:  small for size, minimal images due to bowel gas and habitus 

 

 

 

IMPRESSION:

 

There is cholelithiasis. Mild gallbladder wall thickening and consistent with acute and chronic twyla
cystitis. No dilated ducts. Right kidney not well seen.

 

Single hyperechoic rounded 2.5 cm focus in the right lobe of the liver is nonspecific.

## 2022-07-08 NOTE — CA
Transthoracic Echo Report 

 Name: Dayo Hilliard 

 MRN:    [id number] 

 Age:    62     Gender:     M 

 

 :    1960 

 Exam Date:     2022 08:54 

 Exam Location: [facility] 

 Ht (in):     64     Wt (lb):     268 

 Ordering Physician:        Alek Bynum MD 

 Attending/Referring Phys: 

 Technician         Stephanie Ann RDCS 

 Procedure CPT: 

 Indications:       Syncope 

 

 Cardiac Hx: 

 Technical Quality:      Fair 

 Contrast 1:                                Total Dose (mL): 

 Contrast 2:                                Total Dose (mL): 

 

 MEASUREMENTS  (Male / Female) Normal Values 

 2D ECHO 

 LV Diastolic Diameter PLAX        5.4 cm                4.2 - 5.9 / 3.9 - 5.3 cm 

 LV Systolic Diameter PLAX         3.2 cm                 

 IVS Diastolic Thickness           0.9 cm                0.6 - 1.0 / 0.6 - 0.9 cm 

 LVPW Diastolic Thickness          1.5 cm                0.6 - 1.0 / 0.6 - 0.9 cm 

 LV Relative Wall Thickness        0.4                    

 RV Internal Dim ED PLAX           3.0 cm                 

 LA Systolic Diameter LX           3.6 cm                3.0 - 4.0 / 2.7 - 3.8 cm 

 

 M-MODE 

 Aortic Root Diameter MM           3.1 cm                 

 LA Systolic Diameter MM           3.2 cm                 

 LA Ao Ratio MM                    1.1                    

 MV E Point Septal Separation      0.2 cm                 

 AV Cusp Separation MM             2.1 cm                 

 

 DOPPLER 

 MV Area PHT                       2.4 cm???                

 Mitral E Point Velocity           86.3 cm/s              

 Mitral A Point Velocity           70.6 cm/s              

 Mitral E to A Ratio               1.2                    

 MV Deceleration Time              311.0 ms               

 

 

 FINDINGS 

 Left Ventricle 

 Hyperdynamic left ventricle with significant 60-65% 

 

 Right Ventricle 

 Normal right ventricular size and function. 

 

 Right Atrium 

 Normal right atrial size. Normal right atrial size. 

 

 Left Atrium 

 Normal left atrial size. 

 

 Mitral Valve 

 Structurally normal mitral valve. 

 

 Aortic Valve 

 Aortic valve not well visualized. 

 

 Tricuspid Valve 

 Structurally normal tricuspid valve. Tricuspid valve not well visualized. 

 

 Pulmonic Valve 

 Pulmonic valve not well visualized. 

 

 Pericardium 

 Echo free space anterior to the right ventricle likely represents a fat pad. 

 

 Aorta 

 Normal size aortic root and proximal ascending aorta. 

 

 CONCLUSIONS 

 Hyperdynamic left ventricle was EF of 60-65% 

 Previewed by:  

 Dr. Gonsalo Rivas MD 

 (Electronically Signed) 

 Final Date:      2022 11:38

## 2022-07-08 NOTE — P.CNNES
History of Present Illness


Consult date: 07/08/22


Requesting physician: Alek Bynum


Reason for Consult: syncope w/ possible seizure


History of Present Illness: 


This is a 62-year-old gentleman with medical history of hypertension, DVT on 

eliquis, chronic neck and lower back pain and had two surgeries on the neck, 

chronic anemia, COPD, who presented to the emergency department because of 

multiple episode of loss consciousness.  It seems the patient was in his usual 

state of health until 5 days ago when he started losing consciousness without 

any warning.  Yesterday the patient was with his friend and the patient went 

into a store and as he got out of the car and was in the parking lot


He remember he was on the ground and people was surrounded him.  As a result he 

feels some pain from the back of the head from the fall.  He regained 

consciousness after a few minutes.  He was told by a friend yesterday he had 

shaking of uppers.  Unsure regarding fixation of his eye.  In the past 5 days he

had 4 episodes of loss of consciousness.  He reports waking up on the floor each

time.  He would wake up confused and having urinary incontinence to the 

episodes.  He also reports biting his tongue earlier yesterday. These episodes 

happen while is walking but then later he stated he had two episode while 

sitting on a couch where he had shaking of both uppers and he could not control 

then lost consciousness.  He denies any history of seizures in the past.  Denies

any history of cancer.  He does smoke cigarettes.  He rarely drinks alcohol.





Of note, patient stated he had two surgeries on neck and has residual numbness 

on left side.  He is in process of possible lower back surgery.





Some of the workup in our hospital during this admission consisted of:


Orthostatic vitals as this upon blood pressure is 116/69, sitting is 124/90 and 

standing is 130/59 with a heart rate was never checked but the blood pressure 

there is no drastic change.


Patient's white blood cells 4.4 hemoglobin is 8.5 and his blast cells was 14%


Potassium 2.8, BUN is 39, glucose initial is 113, calcium is 8.1, AST 73 and ALT

of 63.


Corona virus is nondetected.


CT of the head is reported as no acute intracranial process.  MRI can be 

performed as clinically indicated.  I personally reviewed the CT of the head and

I agree with report.


CT cervical spine was reported as degenerative disc changes with anterior 

cervical fusion.  No acute osseous abnormality.








Review of Systems


Review of system:  The 12 point system was reviewed and apparent positive and 

negative per HPI.








Past Medical History


Past Medical History: Blood Disorder, COPD, Hypertension


Additional Past Medical History / Comment(s): chronic back pain


History of Any Multi-Drug Resistant Organisms: None Reported


Past Surgical History: Back Surgery, Orthopedic Surgery


Past Anesthesia/Blood Transfusion Reactions: No Reported Reaction


Smoking Status: Current every day smoker


Past Alcohol Use History: None Reported


Past Drug Use History: None Reported





- Past Family History


  ** Mother


Family Medical History: Hypertension





Medications and Allergies


                                Home Medications











 Medication  Instructions  Recorded  Confirmed  Type


 


ALPRAZolam [Xanax] 1 mg PO TID 07/07/22 07/07/22 History


 


Albuterol Inhaler [Ventolin Hfa 2 puff INHALATION RT-QID PRN 07/07/22 07/07/22 

History





Inhaler]    


 


Apixaban [Eliquis] 5 mg PO BID 07/07/22 07/07/22 History


 


Baclofen 10 mg PO BID 07/07/22 07/07/22 History


 


Fluticasone/Umeclidin/Vilanter 1 puff INHALATION RT-DAILY 07/07/22 07/07/22 Hist

ory





[Trelegy Ellipta 100-62.5-25]    


 


Furosemide [Lasix] 20 mg PO HS 07/07/22 07/07/22 History


 


Furosemide [Lasix] 40 mg PO DAILY 07/07/22 07/07/22 History


 


Mirtazapine 30 mg PO HS 07/07/22 07/07/22 History


 


Montelukast [Singulair] 10 mg PO HS 07/07/22 07/07/22 History


 


Mupirocin 2% Oint [Bactroban 2% 1 applic TOPICAL DAILY 07/07/22 07/07/22 History





Oint]    


 


Naloxone HCl [Narcan] 4 mg NASAL ONCE PRN 07/07/22 07/07/22 History


 


Pantoprazole [Protonix] 40 mg PO DAILY 07/07/22 07/07/22 History


 


Potassium Chloride ER [K-Dur 20] 20 meq PO DAILY 07/07/22 07/07/22 History


 


Pregabalin [Lyrica] 150 mg PO BID 07/07/22 07/07/22 History


 


Promethazine HCl [Phenergan Syrup] 6.25 mg PO Q6H PRN 07/07/22 07/07/22 History


 


hydroCHLOROthiazide [Hydrodiuril] 25 mg PO DAILY 07/07/22 07/07/22 History


 


lisinopriL [Prinivil] 20 mg PO DAILY 07/07/22 07/07/22 History


 


oxyCODONE-APAP 10-325MG [Percocet 1 tab PO TID 07/07/22 07/07/22 History





 mg]    








                                    Allergies











Allergy/AdvReac Type Severity Reaction Status Date / Time


 


Penicillins Allergy  Anaphylaxis Verified 07/07/22 18:15


 


tamsulosin [From Flomax] Allergy  Rash/Hives Verified 07/07/22 17:40


 


ibuprofen AdvReac  Nausea Verified 07/07/22 17:40














Physical Examination





- Vital Signs


Vital Signs: 


                                   Vital Signs











  Temp Pulse Pulse Pulse Resp BP BP


 


 07/08/22 08:00  97.9 F   63   18   122/67


 


 07/08/22 04:09  97.7 F    64  16   124/71


 


 07/08/22 03:11      16  


 


 07/08/22 00:44  97.7 F    62  16   135/70


 


 07/07/22 22:19  97.8 F   72  75  18   133/64


 


 07/07/22 19:20  97.8 F  78    18  139/72 


 


 07/07/22 17:09   75    20  137/71 


 


 07/07/22 16:11    81   18   124/90


 


 07/07/22 16:06   81    18  124/57 


 


 07/07/22 14:31   83  86   18  126/70 


 


 07/07/22 14:00  98.4 F  87    18  131/64 














  BP BP Pulse Ox


 


 07/08/22 08:00    97


 


 07/08/22 04:09    94 L


 


 07/08/22 03:11   


 


 07/08/22 00:44    98


 


 07/07/22 22:19    98


 


 07/07/22 19:20    95


 


 07/07/22 17:09    98


 


 07/07/22 16:11  130/59  116/69 


 


 07/07/22 16:06   


 


 07/07/22 14:31    94 L


 


 07/07/22 14:00    95








                                Intake and Output











 07/07/22 07/08/22 07/08/22





 22:59 06:59 14:59


 


Intake Total  10 240


 


Output Total  300 


 


Balance  -290 240


 


Intake:   


 


  IV  10 


 


    Invasive Line 1  10 


 


  Oral   240


 


Output:   


 


  Urine  300 


 


Other:   


 


  Voiding Method  Toilet Toilet





  Urinal Urinal


 


  Weight 121.563 kg  











GENERAL: The patient is lying in bed and is  in mild acute distress.


CHEST: The heart rate is regular rate rhythm.   No murmurs to auscultation. 


LUNG: Clear to auscultation bilaterally no wheezing noted throughout.  Not 

labored breathing.


ABDOMEN/GI: Bowel sounds present in all 4 quadrants. No tenderness to palpation 

throughout.





NEUROLOGICAL:


Higher mental function: The patient is awake, alert, oriented to self, place and

time.  Patient is following commands.  No aphasia and no neglect.


Cranial nerves: The pupils are round, equal and reactive to light and accommoda

tion.  Visual fields are full to confrontation throughout.  Extraocular movement

is intact no nystagmus is noted.  Facial sensation is normal to touch 

throughout.  The facial strength is normal throughout.  Hearing is normal 

bilaterally to hand rub.  Tongue is midline and moved side-to-side without any 

difficulty.  No dysarthria is noted.  Shoulder shrug is normal bilaterally.


Motor: Gait is deferred because of his pain. The strength is limited uppers 

because of pain but has 4-4+.  Lowers has 4+ while ankles are 5/5.   Normal tone

and bulk.  


Cerebellum: Normal finger to nose  bilaterally.


Sensation: Sensation is decreased to touch over the left side (old).  


Reflexes (right/left): 3+ throughout except ankles are 2+.


Plantars are mute bilaterally. 








Results





- Laboratory Findings


CBC and BMP: 


                                 07/08/22 02:13





                                 07/08/22 07:53


Abnormal Lab Findings: 


                                  Abnormal Labs











  07/07/22 07/07/22 07/07/22





  14:48 14:48 15:58


 


RBC  2.25 L  


 


Hgb  8.5 L  


 


Hct  25.9 L  


 


MCV  114.8 H  


 


MCH  37.5 H  


 


RDW  17.0 H  


 


Plt Count  74 L  


 


Blast Cells %  14 H*  


 


Lymphocytes # (Manual)  0.57 L  


 


Myelocytes # (Manual)  0.09 H  


 


Blast Cells # (Man)  0.62 H  


 


Nucleated RBCs  1 H  


 


Macrocytosis  Marked A  


 


APTT   21.6 L 


 


D-Dimer   3.90 H 


 


Potassium    2.8 L


 


Chloride   


 


Carbon Dioxide    31 H


 


BUN    39 H


 


Glucose    113 H


 


POC Glucose (mg/dL)   


 


Calcium    8.1 L


 


AST    73 H


 


ALT    67 H


 


Total Protein    5.7 L


 


Albumin    3.4 L














  07/07/22 07/07/22 07/08/22





  16:10 23:00 02:13


 


RBC   2.23 L  2.17 L


 


Hgb   8.4 L  8.1 L


 


Hct   25.9 L  25.6 L


 


MCV   116.0 H  117.8 H


 


MCH   37.5 H  37.1 H


 


RDW   16.9 H  17.6 H


 


Plt Count   


 


Blast Cells %   


 


Lymphocytes # (Manual)   


 


Myelocytes # (Manual)   


 


Blast Cells # (Man)   


 


Nucleated RBCs   


 


Macrocytosis   Marked A  Marked A


 


APTT   


 


D-Dimer   


 


Potassium   


 


Chloride   


 


Carbon Dioxide   


 


BUN   


 


Glucose   


 


POC Glucose (mg/dL)  139 H  


 


Calcium   


 


AST   


 


ALT   


 


Total Protein   


 


Albumin   














  07/08/22





  07:53


 


RBC 


 


Hgb 


 


Hct 


 


MCV 


 


MCH 


 


RDW 


 


Plt Count 


 


Blast Cells % 


 


Lymphocytes # (Manual) 


 


Myelocytes # (Manual) 


 


Blast Cells # (Man) 


 


Nucleated RBCs 


 


Macrocytosis 


 


APTT 


 


D-Dimer 


 


Potassium  3.4 L


 


Chloride  108 H


 


Carbon Dioxide 


 


BUN  39 H


 


Glucose  120 H


 


POC Glucose (mg/dL) 


 


Calcium  8.0 L


 


AST  73 H


 


ALT  62 H


 


Total Protein  5.7 L


 


Albumin  3.2 L














Assessment and Plan


Assessment: 





Recurrent Syncopal episodes for past 5 days seems likely seizure (has loss of 

consciousness, urinary incontinence, tongue bite and some episodes shaking of 

uppers that are uncontrolled then proceeded with LOC)


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  


Chronic neck pain


Chronic lower back pain


Chronic anemia


Hypertension


History of DVT on eliquis


COPD


Nicotine use





Plan: 


I ordered a routine EEG.


I also ordered MRI of the brain with and without seizure protocol


Since suspicion is high for seizures and had multiple episodes, I started the 

patient on Keppra 500mg every 12 hours.


2-D echo was ordered by the primary team is pending


She is on seizure precaution seizure pads


Patient is on fall precautions


Continue cardiac monitoring


Placed on neuro checks.


Cardiology is consulted.


Patient was counseled on tobacco cessation.


Will defer the rest of medical management to the primary team.


Upon discharge, patient needs to follow-up with neurologist as outpatient.





The plan is discussed with patient and his nurse.


Thank you for the consultation.





John Cavanaugh M.D.


Neuro-Hospitalist


Time with Patient: Greater than 30

## 2022-07-08 NOTE — P.CRDCN
History of Present Illness


History of present illness: 


HISTORY OF PRESENTING ILLNESS


This is a pleasant 62-year-old male past medical history significant for 

hypertension, COPD, chronic lower extremity edema, history of DVT on Eliquis, 

right leg compound fractures s/p 6 surgeries on the right leg, multiple neck 

surgeries in the past.  He does not follow with a cardiologist regularly.  We 

have been asked to see in consultation for syncope. Patient presents to 

emergency department with multiple syncopal episodes.  Patient states that over 

the past week patient has been having for syncopal episodes.  He states that he 

has symptoms of lightheadedness, and then within 1015 minutes patient gets up 

and then loses consciousness and wakes up on the ground.  He states that he will

feel fine for about 3 hours and then it happens again.  They all occur while the

patient is standing. He has had only one episode similarly over the past 1-2 

years prior to this past week. He endorses also increased cough, fever, and 

chills this week. Yesterday he had an episode where he did have urinary 

incontinence and tongue biting reported. He denies any changes to his 

medications. 


He denies any history of CAD, MI, stroke, diabetes.  Family history includes 

both brothers have had MIs.  He is a current every day smoker, currently smoking

45 cigarettes a day.





DIAGNOSTICS


EKG reveals sinus rhythm, heart rate 85, nonspecific ST or T-wave abnormalities.


Telemetry tracings indicate sinus mechanism, no significant pauses or arrhythmia

noted.


Chest CT reported as questionable small pulmonary emboli cannot be excluded, 

groundglass upper lobe density noted.


Head CT with no acute intracranial process, no fractures of the cervical spine


Venous Dopplers were negative for DVT bilaterally


Laboratory reviewed, WBC 4.4, heme 20.1, platelets pending, sodium 140, 

potassium 3.4, BUN 39, serum creatinine 0.8, troponin negative 3, d-dimer 3.9


Current home cardiac medications include Lasix 40 mg daily and 20 mg daily, 

Eliquis 5 mg twice a day, potassium chloride, hydrochlorothiazide 25 mg daily, 

lisinopril 20 mg daily 





REVIEW OF SYSTEMS


At the time of my exam:


CONSTITUTIONAL: Denies fever or chills. +Syncope


CARDIOVASCULAR: Denies chest pain, shortness of breath, orthopnea, PND or 

palpitations.


RESPIRATORY: Denies cough. 


GASTROINTESTINAL: Denies abdominal pain, diarrhea, constipation, nausea or 

vomiting.


MUSCULOSKELETAL: Denies myalgias.


NEUROLOGIC: Denies numbness, tingling, headacbe or weakness.


ENDOCRINE: Denies fatigue, weight change,  polydipsia or polyurina.


GENITOURINARY: Denies burning, hematuria or urgency with micturation.


HEMATOLOGIC: Denies history of anemia or bleeding. 





PHYSICAL EXAMINATION


Blood pressure 122/67, heart rate 63, afebrile, saturation 97% room air


CONSTITUTIONAL: No apparent distress. 


HEENT: Head is normocephalic. Pupils are equal, round. Sclerae anicteric. Mucous

membranes of the mouth are moist.  No JVD. 


CHEST EXAMINATION: Lungs are diminished, decreased air exchange to auscultation.

No chest wall tenderness is noted on palpation or with deep breathing. 


HEART EXAMINATION: Regular rate and rhythm. S1, S2 heard. No murmurs, gallops or

rub.


ABDOMEN: Soft, nontender. Positive bowel sounds.


EXTREMITIES:  4+ R > L bilateral lower extremity edema and no calf tenderness.


NEUROLOGIC EXAMINATION: Patient is awake, alert and oriented x3. 





ASSESSMENT


Syncopal episodes 


Bilateral lower extremity edema, likely venous insufficiency


Fever, cough, chills increased over the past week


Anemia


Hypertension


Hypokalemia, improving 


History of DVT on Eliquis 


COPD


Chronic nicotine dependence 


History of right leg compound fractures s/p 6 surgeries on the right leg


History of two neck surgeries C4-C5, C6





PLAN


Syncopal episodes possibly related to combination of dehydration, possible 

infection. Does not appear to be cardiac in etiology. Neurology consulted to 

rule out seizure. Lower extremity edema appears more venous insufficiency than 

heart failure. Hold Lasix and Hctz


Obtain 2D echocardiogram and doppler study to assess cardiac structure and 

function. 


Monitor on telemetry


Monitor renal function and electrolytes


Further recommendations based on clinical course





Nurse practitioner note has been reviewed by physician. Signing provider agrees 

with the documented findings, assessment, and plan of care. 














Past Medical History


Past Medical History: Blood Disorder, COPD, Hypertension


Additional Past Medical History / Comment(s): chronic back pain


History of Any Multi-Drug Resistant Organisms: None Reported


Past Surgical History: Back Surgery, Orthopedic Surgery


Past Anesthesia/Blood Transfusion Reactions: No Reported Reaction


Smoking Status: Current every day smoker


Past Alcohol Use History: None Reported


Past Drug Use History: None Reported





- Past Family History


  ** Mother


Family Medical History: Hypertension





Medications and Allergies


                                Home Medications











 Medication  Instructions  Recorded  Confirmed  Type


 


ALPRAZolam [Xanax] 1 mg PO TID 07/07/22 07/07/22 History


 


Albuterol Inhaler [Ventolin Hfa 2 puff INHALATION RT-QID PRN 07/07/22 07/07/22 

History





Inhaler]    


 


Apixaban [Eliquis] 5 mg PO BID 07/07/22 07/07/22 History


 


Baclofen 10 mg PO BID 07/07/22 07/07/22 History


 


Fluticasone/Umeclidin/Vilanter 1 puff INHALATION RT-DAILY 07/07/22 07/07/22 

History





[Trelegy Ellipta 100-62.5-25]    


 


Furosemide [Lasix] 20 mg PO HS 07/07/22 07/07/22 History


 


Furosemide [Lasix] 40 mg PO DAILY 07/07/22 07/07/22 History


 


Mirtazapine 30 mg PO HS 07/07/22 07/07/22 History


 


Montelukast [Singulair] 10 mg PO HS 07/07/22 07/07/22 History


 


Mupirocin 2% Oint [Bactroban 2% 1 applic TOPICAL DAILY 07/07/22 07/07/22 History





Oint]    


 


Naloxone HCl [Narcan] 4 mg NASAL ONCE PRN 07/07/22 07/07/22 History


 


Pantoprazole [Protonix] 40 mg PO DAILY 07/07/22 07/07/22 History


 


Potassium Chloride ER [K-Dur 20] 20 meq PO DAILY 07/07/22 07/07/22 History


 


Pregabalin [Lyrica] 150 mg PO BID 07/07/22 07/07/22 History


 


Promethazine HCl [Phenergan Syrup] 6.25 mg PO Q6H PRN 07/07/22 07/07/22 History


 


hydroCHLOROthiazide [Hydrodiuril] 25 mg PO DAILY 07/07/22 07/07/22 History


 


lisinopriL [Prinivil] 20 mg PO DAILY 07/07/22 07/07/22 History


 


oxyCODONE-APAP 10-325MG [Percocet 1 tab PO TID 07/07/22 07/07/22 History





 mg]    








                                    Allergies











Allergy/AdvReac Type Severity Reaction Status Date / Time


 


Penicillins Allergy  Anaphylaxis Verified 07/07/22 18:15


 


tamsulosin [From Flomax] Allergy  Rash/Hives Verified 07/07/22 17:40


 


ibuprofen AdvReac  Nausea Verified 07/07/22 17:40














Physical Exam


Vitals: 


                                   Vital Signs











  Temp Pulse Pulse Pulse Resp BP BP


 


 07/08/22 04:09  97.7 F    64  16   124/71


 


 07/08/22 03:11      16  


 


 07/08/22 00:44  97.7 F    62  16   135/70


 


 07/07/22 22:19  97.8 F   72  75  18   133/64


 


 07/07/22 19:20  97.8 F  78    18  139/72 


 


 07/07/22 17:09   75    20  137/71 


 


 07/07/22 16:11    81   18   124/90


 


 07/07/22 16:06   81    18  124/57 


 


 07/07/22 14:31   83  86   18  126/70 


 


 07/07/22 14:00  98.4 F  87    18  131/64 














  BP BP Pulse Ox


 


 07/08/22 04:09    94 L


 


 07/08/22 03:11   


 


 07/08/22 00:44    98


 


 07/07/22 22:19    98


 


 07/07/22 19:20    95


 


 07/07/22 17:09    98


 


 07/07/22 16:11  130/59  116/69 


 


 07/07/22 16:06   


 


 07/07/22 14:31    94 L


 


 07/07/22 14:00    95








                                Intake and Output











 07/07/22 07/08/22 07/08/22





 22:59 06:59 14:59


 


Intake Total  10 


 


Output Total  300 


 


Balance  -290 


 


Intake:   


 


  IV  10 


 


    Invasive Line 1  10 


 


Output:   


 


  Urine  300 


 


Other:   


 


  Voiding Method  Toilet 





  Urinal 


 


  Weight 121.563 kg  














Results





                                 07/08/22 02:13





                                 07/08/22 07:53


                                 Cardiac Enzymes











  07/07/22 07/07/22 07/07/22 Range/Units





  15:58 15:58 21:09 


 


AST  73 H    (17-59)  U/L


 


Troponin I   <0.012  <0.012  (0.000-0.034)  ng/mL














  07/07/22 Range/Units





  23:11 


 


AST   (17-59)  U/L


 


Troponin I  <0.012  (0.000-0.034)  ng/mL








                                   Coagulation











  07/07/22 Range/Units





  14:48 


 


PT  11.1  (9.0-12.0)  sec


 


APTT  21.6 L  (22.0-30.0)  sec








                                       CBC











  07/07/22 07/07/22 07/08/22 Range/Units





  14:48 23:00 02:13 


 


WBC  4.4  5.0  4.4  (3.8-10.6)  k/uL


 


RBC  2.25 L  2.23 L  2.17 L  (4.30-5.90)  m/uL


 


Hgb  8.5 L  8.4 L  8.1 L  (13.0-17.5)  gm/dL


 


Hct  25.9 L  25.9 L  25.6 L  (39.0-53.0)  %


 


Plt Count  74 L    (150-450)  k/uL








                          Comprehensive Metabolic Panel











  07/07/22 Range/Units





  15:58 


 


Sodium  142  (137-145)  mmol/L


 


Potassium  2.8 L  (3.5-5.1)  mmol/L


 


Chloride  107  ()  mmol/L


 


Carbon Dioxide  31 H  (22-30)  mmol/L


 


BUN  39 H  (9-20)  mg/dL


 


Creatinine  1.24  (0.66-1.25)  mg/dL


 


Glucose  113 H  (74-99)  mg/dL


 


Calcium  8.1 L  (8.4-10.2)  mg/dL


 


AST  73 H  (17-59)  U/L


 


ALT  67 H  (4-49)  U/L


 


Alkaline Phosphatase  82  ()  U/L


 


Total Protein  5.7 L  (6.3-8.2)  g/dL


 


Albumin  3.4 L  (3.5-5.0)  g/dL








                               Current Medications











Generic Name Dose Route Start Last Admin





  Trade Name Freq  PRN Reason Stop Dose Admin


 


Acetaminophen  650 mg  07/07/22 23:48  07/08/22 01:20





  Acetaminophen Tab 325 Mg Tab  PO   650 mg





  Q6HR PRN   Administration





  Fever and/ or Pain  


 


Albuterol/Ipratropium  3 ml  07/07/22 23:46 





  Ipratropium-Albuterol 3 Ml Neb  INHALATION  





  RT-QID PRN  





  Shortness Of Breath Or Wheezing  


 


Albuterol/Ipratropium  3 ml  07/08/22 08:00  07/08/22 07:29





  Ipratropium-Albuterol 3 Ml Neb  INHALATION   Not Given





  RT-QID ANDERS  


 


Baclofen  10 mg  07/08/22 09:00 





  Baclofen 10 Mg Tab  PO  





  BID [facility]  


 


Budesonide/Formoterol Fumarate  2 puff  07/08/22 08:00  07/08/22 07:29





  Symbicort 80-4.5 Mcg Inhaler  INHALATION   Not Given





  RT-BID [facility]  


 


Hydrochlorothiazide  25 mg  07/08/22 09:00 





  Hydrochlorothiazide 25 Mg Tab  PO  





  DAILY [facility]  


 


Sodium Chloride  1,000 mls @ 50 mls/hr  07/07/22 23:45  07/08/22 02:52





  Saline 0.9%  IV   Not Given





  .Q20H [facility]  


 


Lisinopril  20 mg  07/08/22 09:00 





  Lisinopril 20 Mg Tab  PO  





  DAILY [facility]  


 


Mirtazapine  30 mg  07/08/22 21:00 





  Mirtazapine 15 Mg Tab  PO  





  HS [facility]  


 


Montelukast Sodium  10 mg  07/08/22 21:00 





  Montelukast 10 Mg Tab  PO  





  HS [facility]  


 


Nitroglycerin  0.4 mg  07/07/22 20:38 





  Nitroglycerin Sl Tabs 0.4 Mg Tab  SUBLINGUAL  





  Q5M PRN  





  Chest Pain  


 


Oxycodone/Acetaminophen  1 each  07/08/22 03:30 





  Oxycodone-Apap 10-325mg 1 Each Tab  PO  





  TID [facility]  


 


Pantoprazole Sodium  40 mg  07/08/22 09:00 





  Pantoprazole 40 Mg/10 Ml Vial  IVP  





  BID [facility]  


 


Potassium Chloride  20 meq  07/08/22 09:00 





  Potassium Chloride Er 20 Meq Tab.Er  PO  





  DAILY [facility]  


 


Prednisone  40 mg  07/18/22 00:15 





  Prednisone 20 Mg Tab  PO  





  DAILY [facility]  


 


Pregabalin  150 mg  07/08/22 09:00 





  Pregabalin 75 Mg Cap  PO  





  BID [facility]  








                                Intake and Output











 07/07/22 07/08/22 07/08/22





 22:59 06:59 14:59


 


Intake Total  10 


 


Output Total  300 


 


Balance  -290 


 


Intake:   


 


  IV  10 


 


    Invasive Line 1  10 


 


Output:   


 


  Urine  300 


 


Other:   


 


  Voiding Method  Toilet 





  Urinal 


 


  Weight 121.563 kg  








                                        





                                 07/08/22 02:13 





                                 07/07/22 15:58

## 2022-07-08 NOTE — US
EXAMINATION TYPE: US venous doppler duplex LE 

 

DATE OF EXAM: 7/8/2022 10:21 AM

 

COMPARISON: NONE

 

CLINICAL HISTORY: re: DDimer, increased edema. D Dimer, increased edema per order. Hx DVT in bilatera
l legs. Patient is on eliquis. Hx right knee replacement.

 

SIDE PERFORMED: Bilateral  

 

TECHNIQUE:  The lower extremity deep venous system is examined utilizing real time linear array sonog
marlo with graded compression, doppler sonography and color-flow sonography.

 

VESSELS IMAGED:

Common Femoral Vein

Deep Femoral Vein

Greater Saphenous Vein *

Femoral Vein

Popliteal Vein

Small Saphenous Vein *

Proximal Calf Veins

(* superficial vessels)

Left and right common femoral, superficial femoral, popliteal veins compress normally and show no abn
ormal luminal echoes. Venous waveforms are normal. Edema channels are noted within the right leg.

 

Right Leg:  Unable to visualize calf veins at the ankle due to great amount of edema. No evidence of 
DVT in veins imaged at this time.

 

Left Leg:  No evidence of DVT in veins imaged at this time.

 

 

 

IMPRESSION:  No evident deep venous thrombosis within the lower extremities from the level of the kne
es centrally.

## 2022-07-08 NOTE — P.PN
Subjective


Progress Note Date: 07/08/22 (delayed charting seen at 1330)


Patient is a 62-year-old male withf hypertension, chronic anemia, COPD, chronic 

lower extremity edema, history of DVT on Eliquis, who presented to the emergency

room for multiple episodes of loss of consciousness.  Later during his hospital 

stay he stated his primary care physician sent him to the emergency room due to 

his anemia. Head and cervical spine CT was unremarkable. Chest CTA revealed 

groundglass upper lobe density, possible acute inflammatory process.  EKG 

revealed a junctional rhythm at 85 bpm with poor R-wave progression.  Laboratory

evaluation was remarkable for hemoglobin 8.5, .8, platelet count 74, d-

dimer 3.9, potassium 2.8, CO2 31, BUN 39, creatinine 1.24, troponin less than 

0.012.  He is admitted for further monitoring.  Cardiology was consulted who 

recommended restarting eliquis as due to possible small pulmonary embolism on CT

A.  They did recommend echocardiogram.  He was seen by general surgery who plans

for EGD and colonoscopy on 7/11/22.  He was seen by neurology who is concerned 

with underlying seizure disorder.  He has been started on Keppra.





Imaging:


Echocardiogram ejection fraction 60-65%


EEG: Abnormal routine EEG with background slowing suggestive of mild 

encephalopathy, questionable left frontal temporal epileptiform discharges.








Patient seen and examined at bedside.  He is very overwhelmed.  He is having a 

hard time focusing.  He does report a history of hepatitis C of which he never 

got treatment for.  He denies any use of IV drugs, alcoholism, or previous 

alcoholism.  He feels very overwhelmed as set by all of this.  He recounts his 

episodes of unresponsiveness as well as his concern about his low blood counts. 

He is just feeling overall that this was too much and he has always been 

healthy.





General: nontoxic, no distress, appears at stated age


Derm: warm, dry


Head: atraumatic, normocephalic, symmetric


Eyes: EOMI, no lid lag, anicteric sclera


Mouth: no lip lesion, mucus membranes moist


Cardiovascular: S1S2 reg, no murmur, positive posterior tibial pulse bilateral, 


Lungs: CTA bilateral, no rhonchi, no rales , no accessory muscle use


Abdominal: soft, distended with fluid shift,  nontender to palpation, no 

guarding, no appreciable organomegaly


Ext: no gross muscle atrophy, 3+ edema bilateral lower extremities, no 

contractures


Neuro:  CN II-XI grossly intact, no focal neuro deficits


Psych: Alert, oriented, appropriate affect 





Assessment/plan:


Multiple syncopal episodes likely seizure


- seizure precautions


- Keppra started by neurology


- MRI brain 





Anemia with thrombocytopenia and significant blast cells


-Received call from pathology and there concern for possible myelodysplastic 

syndrome with evolving acute leukemia.  They recommended bone marrow biopsy.  I 

did relay this to oncology and placed a consultation.


- Possible small pulmonary embolism on CT.  Although d-dimer may be elevated due

to oncological process.  Hold eliquis at this time due to concerns for GI beeld.

venous doppler wtihout definitve DVT. Also probable 


-Check ferritin, iron, B12, folate, retic, LDH, Haptoglobin





Fluid overload with hx of hep C 


Transaminitis


- echo normal 


- check urine protein


- check liver US


- Check Hep C labs





Tobacco absue


- cessation





Chronic:


HTN


COPD











Objective





- Vital Signs


Vital signs: 


                                   Vital Signs











Temp  98.0 F   07/08/22 12:08


 


Pulse  64   07/08/22 12:08


 


Resp  18   07/08/22 14:00


 


BP  133/79   07/08/22 12:08


 


Pulse Ox  95   07/08/22 16:25


 


FiO2      








                                 Intake & Output











 07/07/22 07/08/22 07/08/22





 18:59 06:59 18:59


 


Intake Total  10 358


 


Output Total  300 300


 


Balance  -290 58


 


Weight 121.563 kg 121.563 kg 


 


Intake:   


 


  IV  10 


 


    Invasive Line 1  10 


 


  Oral   358


 


Output:   


 


  Urine  300 300


 


Other:   


 


  Voiding Method  Toilet Toilet





  Urinal Urinal














- Labs


CBC & Chem 7: 


                                 07/08/22 02:13





                                 07/08/22 07:53


Labs: 


                  Abnormal Lab Results - Last 24 Hours (Table)











  07/07/22 07/07/22 07/08/22 Range/Units





  14:48 23:00 02:13 


 


RBC  2.25 L  2.23 L  2.17 L  (4.30-5.90)  m/uL


 


Hgb  8.5 L  8.4 L  8.1 L  (13.0-17.5)  gm/dL


 


Hct  25.9 L  25.9 L  25.6 L  (39.0-53.0)  %


 


MCV  114.8 H  116.0 H  117.8 H  (80.0-100.0)  fL


 


MCH  37.5 H  37.5 H  37.1 H  (25.0-35.0)  pg


 


RDW  17.0 H  16.9 H  17.6 H  (11.5-15.5)  %


 


Plt Count  74 L  79 L  72 L  (150-450)  k/uL


 


Blast Cells %  14 H*  9 H*  4 H*  %


 


Lymphocytes # (Manual)  0.57 L  0.75 L  0.84 L  (1.0-4.8)  k/uL


 


Myelocytes # (Manual)  0.09 H    (0)  k/uL


 


Blast Cells # (Man)  0.62 H  0.45 H  0.18 H  (0)  k/uL


 


Nucleated RBCs  1 H  1 H  1 H  (0-0)  /100 WBC


 


Pathologist Review  See comment A    


 


Macrocytosis  Marked A  Marked A  Marked A  


 


Potassium     (3.5-5.1)  mmol/L


 


Chloride     ()  mmol/L


 


BUN     (9-20)  mg/dL


 


Glucose     (74-99)  mg/dL


 


Calcium     (8.4-10.2)  mg/dL


 


AST     (17-59)  U/L


 


ALT     (4-49)  U/L


 


Total Protein     (6.3-8.2)  g/dL


 


Albumin     (3.5-5.0)  g/dL


 


HDL Cholesterol     (40.00-60.00)  mg/dL














  07/08/22 07/08/22 Range/Units





  02:13 07:53 


 


RBC    (4.30-5.90)  m/uL


 


Hgb    (13.0-17.5)  gm/dL


 


Hct    (39.0-53.0)  %


 


MCV    (80.0-100.0)  fL


 


MCH    (25.0-35.0)  pg


 


RDW    (11.5-15.5)  %


 


Plt Count    (150-450)  k/uL


 


Blast Cells %    %


 


Lymphocytes # (Manual)    (1.0-4.8)  k/uL


 


Myelocytes # (Manual)    (0)  k/uL


 


Blast Cells # (Man)    (0)  k/uL


 


Nucleated RBCs    (0-0)  /100 WBC


 


Pathologist Review    


 


Macrocytosis    


 


Potassium   3.4 L  (3.5-5.1)  mmol/L


 


Chloride   108 H  ()  mmol/L


 


BUN   39 H  (9-20)  mg/dL


 


Glucose   120 H  (74-99)  mg/dL


 


Calcium   8.0 L  (8.4-10.2)  mg/dL


 


AST   73 H  (17-59)  U/L


 


ALT   62 H  (4-49)  U/L


 


Total Protein   5.7 L  (6.3-8.2)  g/dL


 


Albumin   3.2 L  (3.5-5.0)  g/dL


 


HDL Cholesterol  32.90 L   (40.00-60.00)  mg/dL

## 2022-07-09 LAB
ALBUMIN SERPL-MCNC: 3.3 G/DL (ref 3.5–5)
ALP SERPL-CCNC: 78 U/L (ref 38–126)
ALT SERPL-CCNC: 69 U/L (ref 4–49)
ANION GAP SERPL CALC-SCNC: 5 MMOL/L
APTT BLD: 21.2 SEC (ref 22–30)
AST SERPL-CCNC: 67 U/L (ref 17–59)
BLASTS # BLD MANUAL: 0.06 K/UL
BUN SERPL-SCNC: 35 MG/DL (ref 9–20)
CALCIUM SPEC-MCNC: 8 MG/DL (ref 8.4–10.2)
CELLS COUNTED: 201
CHLORIDE SERPL-SCNC: 109 MMOL/L (ref 98–107)
CO2 SERPL-SCNC: 28 MMOL/L (ref 22–30)
ERYTHROCYTE [DISTWIDTH] IN BLOOD BY AUTOMATED COUNT: 2.23 M/UL (ref 4.3–5.9)
ERYTHROCYTE [DISTWIDTH] IN BLOOD: 17 % (ref 11.5–15.5)
FIBRINOGEN PPP-MCNC: 279 MG/DL (ref 200–500)
GLUCOSE SERPL-MCNC: 95 MG/DL (ref 74–99)
HCT VFR BLD AUTO: 26.2 % (ref 39–53)
HGB BLD-MCNC: 8.2 GM/DL (ref 13–17.5)
INR PPP: 1 (ref ?–1.2)
LDH SPEC-CCNC: 1180 U/L (ref 313–618)
LYMPHOCYTES # BLD MANUAL: 1.06 K/UL (ref 1–4.8)
MCH RBC QN AUTO: 36.9 PG (ref 25–35)
MCHC RBC AUTO-ENTMCNC: 31.4 G/DL (ref 31–37)
MCV RBC AUTO: 117.5 FL (ref 80–100)
METAMYELOCYTES # BLD: 0.03 K/UL
MONOCYTES # BLD MANUAL: 0.34 K/UL (ref 0–1)
NEUTROPHILS NFR BLD MANUAL: 48 %
NEUTS SEG # BLD MANUAL: 1.34 K/UL (ref 1.3–7.7)
PLATELET # BLD AUTO: 60 K/UL (ref 150–450)
POTASSIUM SERPL-SCNC: 3.5 MMOL/L (ref 3.5–5.1)
PROT SERPL-MCNC: 5.5 G/DL (ref 6.3–8.2)
PT BLD: 11 SEC (ref 9–12)
SODIUM SERPL-SCNC: 142 MMOL/L (ref 137–145)
WBC # BLD AUTO: 2.8 K/UL (ref 3.8–10.6)

## 2022-07-09 RX ADMIN — IPRATROPIUM BROMIDE AND ALBUTEROL SULFATE SCH: .5; 3 SOLUTION RESPIRATORY (INHALATION) at 16:22

## 2022-07-09 RX ADMIN — PANTOPRAZOLE SODIUM SCH MG: 40 INJECTION, POWDER, FOR SOLUTION INTRAVENOUS at 09:18

## 2022-07-09 RX ADMIN — PREGABALIN SCH MG: 75 CAPSULE ORAL at 09:16

## 2022-07-09 RX ADMIN — IPRATROPIUM BROMIDE AND ALBUTEROL SULFATE SCH ML: .5; 3 SOLUTION RESPIRATORY (INHALATION) at 07:22

## 2022-07-09 RX ADMIN — OXYCODONE HYDROCHLORIDE AND ACETAMINOPHEN SCH EACH: 10; 325 TABLET ORAL at 18:16

## 2022-07-09 RX ADMIN — BACLOFEN SCH MG: 10 TABLET ORAL at 20:32

## 2022-07-09 RX ADMIN — BACLOFEN SCH MG: 10 TABLET ORAL at 09:16

## 2022-07-09 RX ADMIN — OXYCODONE HYDROCHLORIDE AND ACETAMINOPHEN SCH EACH: 10; 325 TABLET ORAL at 09:16

## 2022-07-09 RX ADMIN — BUDESONIDE AND FORMOTEROL FUMARATE DIHYDRATE SCH PUFF: 80; 4.5 AEROSOL RESPIRATORY (INHALATION) at 20:13

## 2022-07-09 RX ADMIN — MONTELUKAST SODIUM SCH MG: 10 TABLET, FILM COATED ORAL at 20:32

## 2022-07-09 RX ADMIN — BUDESONIDE AND FORMOTEROL FUMARATE DIHYDRATE SCH PUFF: 80; 4.5 AEROSOL RESPIRATORY (INHALATION) at 07:22

## 2022-07-09 RX ADMIN — OXYCODONE HYDROCHLORIDE AND ACETAMINOPHEN SCH EACH: 10; 325 TABLET ORAL at 20:31

## 2022-07-09 RX ADMIN — PANTOPRAZOLE SODIUM SCH MG: 40 INJECTION, POWDER, FOR SOLUTION INTRAVENOUS at 20:35

## 2022-07-09 RX ADMIN — PREGABALIN SCH MG: 75 CAPSULE ORAL at 20:32

## 2022-07-09 RX ADMIN — IPRATROPIUM BROMIDE AND ALBUTEROL SULFATE SCH ML: .5; 3 SOLUTION RESPIRATORY (INHALATION) at 20:13

## 2022-07-09 RX ADMIN — POTASSIUM CHLORIDE SCH MEQ: 20 TABLET, EXTENDED RELEASE ORAL at 09:16

## 2022-07-09 RX ADMIN — CEFAZOLIN SCH: 330 INJECTION, POWDER, FOR SOLUTION INTRAMUSCULAR; INTRAVENOUS at 18:33

## 2022-07-09 RX ADMIN — IPRATROPIUM BROMIDE AND ALBUTEROL SULFATE SCH ML: .5; 3 SOLUTION RESPIRATORY (INHALATION) at 11:07

## 2022-07-09 NOTE — NM
EXAMINATION TYPE: NM hepatobiliary w CCK

 

DATE OF EXAM: 7/9/2022

 

COMPARISON: NONE

 

HISTORY:

 

TECHNIQUE: After the intravenous administration of 5.1 mCi Tc 99m Mebrofenin hepatobiliary scintigrap
hy is performed.  Immediate images post injection.

 

FINDINGS: 

There is satisfactory initial accumulation of tracer by the liver.  The gallbladder is visualized wit
hin 15 minutes.  The small bowel activity is noted within 45 minutes.  At one hour CCK was administer
ed, patient was injected with 2.4 mcg of Kinevac, and gallbladder ejection fraction is calculated at 
53 %, in the normal range.  

IMPRESSION: 

 

No focal liver defect. Gallbladder ejection fraction is 53% which is fairly normal. No evidence of cy
stic duct or common bile duct obstruction.

## 2022-07-09 NOTE — P.PN
Subjective


Progress Note Date: 07/09/22


I spoke with the patient's nurse and no further seizure-like activity noted in 

our facility.


It was felt patient has acute Leukemia/myelodysplastic syndrome.


Unable to obtain MRI Brain since patient has IV filters.  


Upon seeing him he seems drowsy.








Objective





- Vital Signs


Vital signs: 


                                   Vital Signs











Temp  97.6 F   07/09/22 08:00


 


Pulse  56 L  07/09/22 11:22


 


Resp  18   07/09/22 09:14


 


BP  110/57   07/09/22 08:00


 


Pulse Ox  95   07/09/22 08:00


 


FiO2      








                                 Intake & Output











 07/08/22 07/09/22 07/09/22





 18:59 06:59 18:59


 


Intake Total 838  120


 


Output Total 500 600 


 


Balance 338 -600 120


 


Intake:   


 


  Oral 838  120


 


Output:   


 


  Urine 500 600 


 


Other:   


 


  Voiding Method Toilet Toilet Toilet





 Urinal Urinal Urinal














- Exam





GENERAL: The patient is lying in bed and is  in mild acute distress.








NEUROLOGICAL:


Higher mental function: The patient is awake, alert, oriented to self, place and

time.  Patient is following commands.  No aphasia and no neglect.


Cranial nerves: The pupils are round, equal and reactive to light and 

accommodation.  Visual fields are full to confrontation throughout.  Extraocular

movement is intact no nystagmus is noted.  Facial sensation is normal to touch 

throughout.  The facial strength is normal throughout.  Hearing is normal rosanne

aterally to hand rub.  Tongue is midline and moved side-to-side without any 

difficulty.  No dysarthria is noted.  Shoulder shrug is normal bilaterally.


Motor: Gait is deferred because of his pain. The strength is limited uppers 

because of pain but has 4-4+.  Lowers has 4+ while ankles are 5/5.   Normal tone

and bulk.  


Cerebellum: Normal finger to nose  bilaterally.


Sensation: Sensation is decreased to touch over the left side (old).  


Reflexes (right/left): 3+ throughout except ankles are 2+.


Plantars are mute bilaterally. 





Some of the workup in our hospital during this admission consisted of:


Orthostatic vitals as this upon blood pressure is 116/69, sitting is 124/90 and 

standing is 130/59 with a heart rate was never checked but the blood pressure 

there is no drastic change.


Patient's white blood cells 4.4 hemoglobin is 8.5 and his blast cells was 14%


Potassium 2.8, BUN is 39, glucose initial is 113, calcium is 8.1, AST 73 and ALT

of 63.


Corona virus is nondetected.


CT of the head is reported as no acute intracranial process.  MRI can be 

performed as clinically indicated.  I personally reviewed the CT of the head and

I agree with report.


CT cervical spine was reported as degenerative disc changes with anterior 

cervical fusion.  No acute osseous abnormality.


Routine EEG is abnormal.  The back was on the suggestive of mild encephalopathy.

 There are questionable rare frontal temporal central epileptiform discharges.  

Otherwise there is no seizure or focal slowing detected on the study.








- Labs


CBC & Chem 7: 


                                 07/09/22 07:12





                                 07/09/22 07:12


Labs: 


                  Abnormal Lab Results - Last 24 Hours (Table)











  07/07/22 07/07/22 07/08/22 Range/Units





  14:48 23:00 02:13 


 


WBC     (3.8-10.6)  k/uL


 


RBC     (4.30-5.90)  m/uL


 


Hgb     (13.0-17.5)  gm/dL


 


Hct     (39.0-53.0)  %


 


MCV     (80.0-100.0)  fL


 


MCH     (25.0-35.0)  pg


 


RDW     (11.5-15.5)  %


 


Plt Count   79 L  72 L  (150-450)  k/uL


 


Blast Cells %   9 H*  4 H*  %


 


Lymphocytes # (Manual)   0.75 L  0.84 L  (1.0-4.8)  k/uL


 


Metamyelocytes # (Man)     (0)  k/uL


 


Blast Cells # (Man)   0.45 H  0.18 H  (0)  k/uL


 


Nucleated RBCs   1 H  1 H  (0-0)  /100 WBC


 


Pathologist Review  See comment A    


 


Macrocytosis     


 


ESR     (0-15)  mm/hr


 


Retic Count     (0.5-2.0)  %


 


APTT     (22.0-30.0)  sec


 


D-Dimer     (<0.60)  mg/L FEU


 


Chloride     ()  mmol/L


 


BUN     (9-20)  mg/dL


 


Calcium     (8.4-10.2)  mg/dL


 


Iron     ()  ug/dL


 


% Saturation     (15.00-50.00)   


 


AST     (17-59)  U/L


 


ALT     (4-49)  U/L


 


Lactate Dehydrogenase     (313-618)  U/L


 


Total Protein     (6.3-8.2)  g/dL


 


Total Protein (PEP)     (6.2-8.2)  g/dL


 


Albumin     (3.5-5.0)  g/dL


 


HDL Cholesterol     (40.00-60.00)  mg/dL


 


IgG     (700.0-1600.0)  mg/dL














  07/08/22 07/08/22 07/08/22 Range/Units





  02:13 16:47 16:47 


 


WBC     (3.8-10.6)  k/uL


 


RBC     (4.30-5.90)  m/uL


 


Hgb     (13.0-17.5)  gm/dL


 


Hct     (39.0-53.0)  %


 


MCV     (80.0-100.0)  fL


 


MCH     (25.0-35.0)  pg


 


RDW     (11.5-15.5)  %


 


Plt Count     (150-450)  k/uL


 


Blast Cells %     %


 


Lymphocytes # (Manual)     (1.0-4.8)  k/uL


 


Metamyelocytes # (Man)     (0)  k/uL


 


Blast Cells # (Man)     (0)  k/uL


 


Nucleated RBCs     (0-0)  /100 WBC


 


Pathologist Review     


 


Macrocytosis     


 


ESR    39 H  (0-15)  mm/hr


 


Retic Count    2.7 H  (0.5-2.0)  %


 


APTT     (22.0-30.0)  sec


 


D-Dimer     (<0.60)  mg/L FEU


 


Chloride     ()  mmol/L


 


BUN     (9-20)  mg/dL


 


Calcium     (8.4-10.2)  mg/dL


 


Iron   55 L   ()  ug/dL


 


% Saturation   13.71 L   (15.00-50.00)   


 


AST     (17-59)  U/L


 


ALT     (4-49)  U/L


 


Lactate Dehydrogenase   1042 H   (313-618)  U/L


 


Total Protein     (6.3-8.2)  g/dL


 


Total Protein (PEP)     (6.2-8.2)  g/dL


 


Albumin     (3.5-5.0)  g/dL


 


HDL Cholesterol  32.90 L    (40.00-60.00)  mg/dL


 


IgG     (700.0-1600.0)  mg/dL














  07/08/22 07/08/22 07/08/22 Range/Units





  16:47 16:47 16:47 


 


WBC     (3.8-10.6)  k/uL


 


RBC     (4.30-5.90)  m/uL


 


Hgb     (13.0-17.5)  gm/dL


 


Hct     (39.0-53.0)  %


 


MCV     (80.0-100.0)  fL


 


MCH     (25.0-35.0)  pg


 


RDW     (11.5-15.5)  %


 


Plt Count     (150-450)  k/uL


 


Blast Cells %     %


 


Lymphocytes # (Manual)     (1.0-4.8)  k/uL


 


Metamyelocytes # (Man)     (0)  k/uL


 


Blast Cells # (Man)     (0)  k/uL


 


Nucleated RBCs     (0-0)  /100 WBC


 


Pathologist Review     


 


Macrocytosis     


 


ESR     (0-15)  mm/hr


 


Retic Count     (0.5-2.0)  %


 


APTT     (22.0-30.0)  sec


 


D-Dimer   2.00 H   (<0.60)  mg/L FEU


 


Chloride     ()  mmol/L


 


BUN     (9-20)  mg/dL


 


Calcium     (8.4-10.2)  mg/dL


 


Iron     ()  ug/dL


 


% Saturation     (15.00-50.00)   


 


AST     (17-59)  U/L


 


ALT     (4-49)  U/L


 


Lactate Dehydrogenase     (313-618)  U/L


 


Total Protein     (6.3-8.2)  g/dL


 


Total Protein (PEP)    5.4 L  (6.2-8.2)  g/dL


 


Albumin     (3.5-5.0)  g/dL


 


HDL Cholesterol     (40.00-60.00)  mg/dL


 


IgG  598.0 L    (700.0-1600.0)  mg/dL














  07/09/22 07/09/22 07/09/22 Range/Units





  07:12 07:12 07:12 


 


WBC  2.8 L    (3.8-10.6)  k/uL


 


RBC  2.23 L    (4.30-5.90)  m/uL


 


Hgb  8.2 L    (13.0-17.5)  gm/dL


 


Hct  26.2 L    (39.0-53.0)  %


 


MCV  117.5 H    (80.0-100.0)  fL


 


MCH  36.9 H    (25.0-35.0)  pg


 


RDW  17.0 H    (11.5-15.5)  %


 


Plt Count  60 L    (150-450)  k/uL


 


Blast Cells %  2 H*    %


 


Lymphocytes # (Manual)     (1.0-4.8)  k/uL


 


Metamyelocytes # (Man)  0.03 H    (0)  k/uL


 


Blast Cells # (Man)  0.06 H    (0)  k/uL


 


Nucleated RBCs     (0-0)  /100 WBC


 


Pathologist Review     


 


Macrocytosis  Marked A    


 


ESR     (0-15)  mm/hr


 


Retic Count     (0.5-2.0)  %


 


APTT   21.2 L   (22.0-30.0)  sec


 


D-Dimer     (<0.60)  mg/L FEU


 


Chloride    109 H  ()  mmol/L


 


BUN    35 H  (9-20)  mg/dL


 


Calcium    8.0 L  (8.4-10.2)  mg/dL


 


Iron     ()  ug/dL


 


% Saturation     (15.00-50.00)   


 


AST    67 H  (17-59)  U/L


 


ALT    69 H  (4-49)  U/L


 


Lactate Dehydrogenase    1180 H  (313-618)  U/L


 


Total Protein    5.5 L  (6.3-8.2)  g/dL


 


Total Protein (PEP)     (6.2-8.2)  g/dL


 


Albumin    3.3 L  (3.5-5.0)  g/dL


 


HDL Cholesterol     (40.00-60.00)  mg/dL


 


IgG     (700.0-1600.0)  mg/dL














Assessment and Plan


Assessment: 





Recurrent Syncopal-like episodes for past 5 days seems are likely seizure (has 

loss of consciousness, urinary incontinence, tongue bite and some episodes 

shaking of uppers that are uncontrolled then proceeded with LOC.  Had episodes 

while walking or sitting down). EEG revealed questionable rare fronto-temporal 

central epileptiform discharges.  No seizures.


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  


Chronic neck pain


Chronic lower back pain


Chronic anemia


Hypertension


History of DVT on eliquis


COPD


History of hepatitis 


Nicotine use





Plan: 


Yesterday I loaded the patient with Keppra 1500 mg once been started the patient

on Keppra 500 every 12 hours.  I'll increase the Keppra to 750 every 12 hours 

since he's having episode of confusion.


Cannot obtain MRI of the brain since the patient has IVC filter.  Recommend MRI 

of the brain with and without as an outpatient that is a IVC filter compatible.


Recommend 2.5 hour ambulatory EEG as an outpatient and EEG tech will attempt to 

coordinate but if he continues to have confusion will perform repeat EEG in our 

facility.


Oncology team is on board is seems the patient is scheduled for bone marrow 

biopsy this Tuesday


Patient is on fall precautions


On seizure precautions seizure pads.


Cardiology is consulted.


Patient was counseled on tobacco cessation.


Will defer the rest of medical management to the primary team.


Upon discharge, patient needs to follow-up with neurologist as outpatient within

1-2 weeks..





The plan is discussed with patient and his nurse and primary team.





John Cavanaugh M.D.


Neuro-Hospitalist


Time with Patient: Less than 30

## 2022-07-09 NOTE — P.PN
Subjective


Progress Note Date: 07/09/22


Principal diagnosis: 





Anemia, GI bleed





Patient doing better today.  Denies pain.  No bloody stools or black stools.  

Hemoglobin stable at 8.2.  White blood cell count decreased slightly at 2.8.  

Anticoagulation on hold.  Patient had an ultrasound performed showing 

gallstones.





Objective





- Vital Signs


Vital signs: 


                                   Vital Signs











Temp  97.7 F   07/09/22 04:00


 


Pulse  56 L  07/09/22 07:36


 


Resp  14   07/09/22 04:00


 


BP  122/67   07/09/22 04:00


 


Pulse Ox  96   07/09/22 04:00


 


FiO2      








                                 Intake & Output











 07/08/22 07/09/22 07/09/22





 18:59 06:59 18:59


 


Intake Total 838  


 


Output Total 500 600 


 


Balance 338 -600 


 


Intake:   


 


  Oral 838  


 


Output:   


 


  Urine 500 600 


 


Other:   


 


  Voiding Method Toilet Toilet 





 Urinal Urinal 














- Exam





Abdomen: Soft, nontender, nondistended





- Labs


CBC & Chem 7: 


                                 07/09/22 07:12





                                 07/09/22 07:12


Labs: 


                  Abnormal Lab Results - Last 24 Hours (Table)











  07/07/22 07/07/22 07/08/22 Range/Units





  14:48 23:00 02:13 


 


WBC     (3.8-10.6)  k/uL


 


RBC  2.25 L    (4.30-5.90)  m/uL


 


Hgb  8.5 L    (13.0-17.5)  gm/dL


 


Hct  25.9 L    (39.0-53.0)  %


 


MCV  114.8 H    (80.0-100.0)  fL


 


MCH  37.5 H    (25.0-35.0)  pg


 


RDW  17.0 H    (11.5-15.5)  %


 


Plt Count  74 L  79 L  72 L  (150-450)  k/uL


 


Blast Cells %  14 H*  9 H*  4 H*  %


 


Lymphocytes # (Manual)  0.57 L  0.75 L  0.84 L  (1.0-4.8)  k/uL


 


Myelocytes # (Manual)  0.09 H    (0)  k/uL


 


Blast Cells # (Man)  0.62 H  0.45 H  0.18 H  (0)  k/uL


 


Nucleated RBCs  1 H  1 H  1 H  (0-0)  /100 WBC


 


Pathologist Review  See comment A    


 


Macrocytosis  Marked A    


 


ESR     (0-15)  mm/hr


 


Retic Count     (0.5-2.0)  %


 


APTT     (22.0-30.0)  sec


 


D-Dimer     (<0.60)  mg/L FEU


 


Chloride     ()  mmol/L


 


BUN     (9-20)  mg/dL


 


Calcium     (8.4-10.2)  mg/dL


 


Iron     ()  ug/dL


 


% Saturation     (15.00-50.00)   


 


AST     (17-59)  U/L


 


ALT     (4-49)  U/L


 


Lactate Dehydrogenase     (313-618)  U/L


 


Total Protein     (6.3-8.2)  g/dL


 


Total Protein (PEP)     (6.2-8.2)  g/dL


 


Albumin     (3.5-5.0)  g/dL


 


HDL Cholesterol     (40.00-60.00)  mg/dL


 


IgG     (700.0-1600.0)  mg/dL














  07/08/22 07/08/22 07/08/22 Range/Units





  02:13 16:47 16:47 


 


WBC     (3.8-10.6)  k/uL


 


RBC     (4.30-5.90)  m/uL


 


Hgb     (13.0-17.5)  gm/dL


 


Hct     (39.0-53.0)  %


 


MCV     (80.0-100.0)  fL


 


MCH     (25.0-35.0)  pg


 


RDW     (11.5-15.5)  %


 


Plt Count     (150-450)  k/uL


 


Blast Cells %     %


 


Lymphocytes # (Manual)     (1.0-4.8)  k/uL


 


Myelocytes # (Manual)     (0)  k/uL


 


Blast Cells # (Man)     (0)  k/uL


 


Nucleated RBCs     (0-0)  /100 WBC


 


Pathologist Review     


 


Macrocytosis     


 


ESR    39 H  (0-15)  mm/hr


 


Retic Count    2.7 H  (0.5-2.0)  %


 


APTT     (22.0-30.0)  sec


 


D-Dimer     (<0.60)  mg/L FEU


 


Chloride     ()  mmol/L


 


BUN     (9-20)  mg/dL


 


Calcium     (8.4-10.2)  mg/dL


 


Iron   55 L   ()  ug/dL


 


% Saturation   13.71 L   (15.00-50.00)   


 


AST     (17-59)  U/L


 


ALT     (4-49)  U/L


 


Lactate Dehydrogenase   1042 H   (313-618)  U/L


 


Total Protein     (6.3-8.2)  g/dL


 


Total Protein (PEP)     (6.2-8.2)  g/dL


 


Albumin     (3.5-5.0)  g/dL


 


HDL Cholesterol  32.90 L    (40.00-60.00)  mg/dL


 


IgG     (700.0-1600.0)  mg/dL














  07/08/22 07/08/22 07/08/22 Range/Units





  16:47 16:47 16:47 


 


WBC     (3.8-10.6)  k/uL


 


RBC     (4.30-5.90)  m/uL


 


Hgb     (13.0-17.5)  gm/dL


 


Hct     (39.0-53.0)  %


 


MCV     (80.0-100.0)  fL


 


MCH     (25.0-35.0)  pg


 


RDW     (11.5-15.5)  %


 


Plt Count     (150-450)  k/uL


 


Blast Cells %     %


 


Lymphocytes # (Manual)     (1.0-4.8)  k/uL


 


Myelocytes # (Manual)     (0)  k/uL


 


Blast Cells # (Man)     (0)  k/uL


 


Nucleated RBCs     (0-0)  /100 WBC


 


Pathologist Review     


 


Macrocytosis     


 


ESR     (0-15)  mm/hr


 


Retic Count     (0.5-2.0)  %


 


APTT     (22.0-30.0)  sec


 


D-Dimer   2.00 H   (<0.60)  mg/L FEU


 


Chloride     ()  mmol/L


 


BUN     (9-20)  mg/dL


 


Calcium     (8.4-10.2)  mg/dL


 


Iron     ()  ug/dL


 


% Saturation     (15.00-50.00)   


 


AST     (17-59)  U/L


 


ALT     (4-49)  U/L


 


Lactate Dehydrogenase     (313-618)  U/L


 


Total Protein     (6.3-8.2)  g/dL


 


Total Protein (PEP)    5.4 L  (6.2-8.2)  g/dL


 


Albumin     (3.5-5.0)  g/dL


 


HDL Cholesterol     (40.00-60.00)  mg/dL


 


IgG  598.0 L    (700.0-1600.0)  mg/dL














  07/09/22 07/09/22 07/09/22 Range/Units





  07:12 07:12 07:12 


 


WBC  2.8 L    (3.8-10.6)  k/uL


 


RBC  2.23 L    (4.30-5.90)  m/uL


 


Hgb  8.2 L    (13.0-17.5)  gm/dL


 


Hct  26.2 L    (39.0-53.0)  %


 


MCV  117.5 H    (80.0-100.0)  fL


 


MCH  36.9 H    (25.0-35.0)  pg


 


RDW  17.0 H    (11.5-15.5)  %


 


Plt Count     (150-450)  k/uL


 


Blast Cells %     %


 


Lymphocytes # (Manual)     (1.0-4.8)  k/uL


 


Myelocytes # (Manual)     (0)  k/uL


 


Blast Cells # (Man)     (0)  k/uL


 


Nucleated RBCs     (0-0)  /100 WBC


 


Pathologist Review     


 


Macrocytosis  Marked A    


 


ESR     (0-15)  mm/hr


 


Retic Count     (0.5-2.0)  %


 


APTT   21.2 L   (22.0-30.0)  sec


 


D-Dimer     (<0.60)  mg/L FEU


 


Chloride    109 H  ()  mmol/L


 


BUN    35 H  (9-20)  mg/dL


 


Calcium    8.0 L  (8.4-10.2)  mg/dL


 


Iron     ()  ug/dL


 


% Saturation     (15.00-50.00)   


 


AST    67 H  (17-59)  U/L


 


ALT    69 H  (4-49)  U/L


 


Lactate Dehydrogenase    1180 H  (313-618)  U/L


 


Total Protein    5.5 L  (6.3-8.2)  g/dL


 


Total Protein (PEP)     (6.2-8.2)  g/dL


 


Albumin    3.3 L  (3.5-5.0)  g/dL


 


HDL Cholesterol     (40.00-60.00)  mg/dL


 


IgG     (700.0-1600.0)  mg/dL














Assessment and Plan


(1) Anemia


Narrative/Plan: 


62-year-old male with anemia and possible GI bleed.  Patient scheduled currently

for upper and lower endoscopy on Monday.  Cardiology neurology and oncology 

evaluations noted.  Bone marrow biopsy tentatively plan for Tuesday.  Follow 

labs closely.  We'll reevaluate tomorrow.


Current Visit: Yes   Status: Acute   Code(s): D64.9 - ANEMIA, UNSPECIFIED   

SNOMED Code(s): 472569851

## 2022-07-09 NOTE — P.PN
Subjective





HISTORY OF PRESENTING ILLNESS


This is a pleasant 62-year-old male past medical history significant for 

hypertension, COPD, chronic lower extremity edema, history of DVT on Eliquis, 

right leg compound fractures s/p 6 surgeries on the right leg, multiple neck 

surgeries in the past.  He does not follow with a cardiologist regularly.  We 

have been asked to see in consultation for syncope. Patient presents to 

emergency department with multiple syncopal episodes.  Patient states that over 

the past week patient has been having for syncopal episodes.  He states that he 

has symptoms of lightheadedness, and then within 1015 minutes patient gets up a

nd then loses consciousness and wakes up on the ground.  He states that he will 

feel fine for about 3 hours and then it happens again.  They all occur while the

patient is standing. He has had only one episode similarly over the past 1-2 

years prior to this past week. He endorses also increased cough, fever, and 

chills this week. Yesterday he had an episode where he did have urinary 

incontinence and tongue biting reported. He denies any changes to his 

medications. 


He denies any history of CAD, MI, stroke, diabetes.  Family history includes 

both brothers have had MIs.  He is a current every day smoker, currently smoking

45 cigarettes a day.





7/9


Patient seen and examined.  Diuretics have been held and receiving IV fluids.  

Blood pressure has been stable with systolics 120s 130s.  Echocardiogram 

performed yesterday shows normal EF 60-65% without significant valvular disease.

 Telemetry shows normal sinus rhythm with occasional sinus bradycardia no 

significant arrhythmias.  Blasts noted on smear and concern of possible acute 

leukemia.  Additionally concern of possible seizure disorder.





REVIEW OF SYSTEMS


At the time of my exam:


CONSTITUTIONAL: Denies fever or chills. +Syncope


CARDIOVASCULAR: Denies chest pain, shortness of breath, orthopnea, PND or 

palpitations.


RESPIRATORY: Denies cough. 


GASTROINTESTINAL: Denies abdominal pain, diarrhea, constipation, nausea or 

vomiting.


MUSCULOSKELETAL: Denies myalgias.


NEUROLOGIC: Denies numbness, tingling, headacbe or weakness.


ENDOCRINE: Denies fatigue, weight change,  polydipsia or polyurina.


GENITOURINARY: Denies burning, hematuria or urgency with micturation.


HEMATOLOGIC: Denies history of anemia or bleeding. 





PHYSICAL EXAMINATION


Vitals reviewed


CONSTITUTIONAL: No apparent distress. 


HEENT: Head is normocephalic. Pupils are equal, round. Sclerae anicteric. Mucous

membranes of the mouth are moist.  No JVD. 


CHEST EXAMINATION: Lungs are diminished, decreased air exchange to auscultation.

No chest wall tenderness is noted on palpation or with deep breathing. 


HEART EXAMINATION: Regular rate and rhythm. S1, S2 heard. No murmurs, gallops or

rub.


ABDOMEN: Soft, nontender. Positive bowel sounds.


EXTREMITIES:  4+ R > L bilateral lower extremity edema and no calf tenderness.


NEUROLOGIC EXAMINATION: Patient is awake, alert and oriented x3. 





ASSESSMENT


Syncopal episodes, most of these appear orthostatic and likely exacerbated by 

possible infection, dehydration.  


Bilateral lower extremity edema, likely venous insufficiency


Fever, cough, chills increased over the past week


Anemia


Hypertension


Hypokalemia, improving 


History of DVT on Eliquis 


COPD


Chronic nicotine dependence 


History of right leg compound fractures s/p 6 surgeries on the right leg


History of two neck surgeries C4-C5, C6


Anemia with blasts.  Rule out myelodysplastic syndrome/acute leukemia.





PLAN


Patient having recent infectious symptoms as well as concern of possible 

myelodysplastic syndrome/leukemia.  Additionally mildly abnormal EEG and 

neurology consideration of possible seizure.  Continue to hold diuretics and 

monitor patient symptomatically.  Chronic right lower extremity swelling related

to prior surgeries/DVT and venous insufficiency.





Echocardiogram shows preserved EF without significant valvular disease.  No 

further recommendations from a cardiology standpoint.  Recommend patient 

discharged home on a 30 day event monitor and follow-up in office in 1-2 weeks. 













Objective





- Vital Signs


Vital signs: 


                                   Vital Signs











Temp  97.9 F   07/09/22 00:00


 


Pulse  53 L  07/09/22 00:00


 


Resp  12   07/09/22 00:00


 


BP  137/68   07/09/22 00:00


 


Pulse Ox  95   07/09/22 00:00


 


FiO2      








                                 Intake & Output











 07/08/22 07/08/22 07/09/22





 06:59 18:59 06:59


 


Intake Total 10 838 


 


Output Total 300 500 


 


Balance -290 338 


 


Weight 121.563 kg  


 


Intake:   


 


  IV 10  


 


    Invasive Line 1 10  


 


  Oral  838 


 


Output:   


 


  Urine 300 500 


 


Other:   


 


  Voiding Method Toilet Toilet Toilet





 Urinal Urinal Urinal














- Labs


CBC & Chem 7: 


                                 07/08/22 02:13





                                 07/08/22 07:53


Labs: 


                  Abnormal Lab Results - Last 24 Hours (Table)











  07/07/22 07/07/22 07/08/22 Range/Units





  14:48 23:00 02:13 


 


RBC  2.25 L    (4.30-5.90)  m/uL


 


Hgb  8.5 L    (13.0-17.5)  gm/dL


 


Hct  25.9 L    (39.0-53.0)  %


 


MCV  114.8 H    (80.0-100.0)  fL


 


MCH  37.5 H    (25.0-35.0)  pg


 


RDW  17.0 H    (11.5-15.5)  %


 


Plt Count  74 L  79 L  72 L  (150-450)  k/uL


 


Blast Cells %  14 H*  9 H*  4 H*  %


 


Lymphocytes # (Manual)  0.57 L  0.75 L  0.84 L  (1.0-4.8)  k/uL


 


Myelocytes # (Manual)  0.09 H    (0)  k/uL


 


Blast Cells # (Man)  0.62 H  0.45 H  0.18 H  (0)  k/uL


 


Nucleated RBCs  1 H  1 H  1 H  (0-0)  /100 WBC


 


Pathologist Review  See comment A    


 


Macrocytosis  Marked A    


 


ESR     (0-15)  mm/hr


 


Retic Count     (0.5-2.0)  %


 


D-Dimer     (<0.60)  mg/L FEU


 


Potassium     (3.5-5.1)  mmol/L


 


Chloride     ()  mmol/L


 


BUN     (9-20)  mg/dL


 


Glucose     (74-99)  mg/dL


 


Calcium     (8.4-10.2)  mg/dL


 


Iron     ()  ug/dL


 


% Saturation     (15.00-50.00)   


 


AST     (17-59)  U/L


 


ALT     (4-49)  U/L


 


Lactate Dehydrogenase     (313-618)  U/L


 


Total Protein     (6.3-8.2)  g/dL


 


Total Protein (PEP)     (6.2-8.2)  g/dL


 


Albumin     (3.5-5.0)  g/dL


 


HDL Cholesterol     (40.00-60.00)  mg/dL


 


IgG     (700.0-1600.0)  mg/dL














  07/08/22 07/08/22 07/08/22 Range/Units





  02:13 07:53 16:47 


 


RBC     (4.30-5.90)  m/uL


 


Hgb     (13.0-17.5)  gm/dL


 


Hct     (39.0-53.0)  %


 


MCV     (80.0-100.0)  fL


 


MCH     (25.0-35.0)  pg


 


RDW     (11.5-15.5)  %


 


Plt Count     (150-450)  k/uL


 


Blast Cells %     %


 


Lymphocytes # (Manual)     (1.0-4.8)  k/uL


 


Myelocytes # (Manual)     (0)  k/uL


 


Blast Cells # (Man)     (0)  k/uL


 


Nucleated RBCs     (0-0)  /100 WBC


 


Pathologist Review     


 


Macrocytosis     


 


ESR     (0-15)  mm/hr


 


Retic Count     (0.5-2.0)  %


 


D-Dimer     (<0.60)  mg/L FEU


 


Potassium   3.4 L   (3.5-5.1)  mmol/L


 


Chloride   108 H   ()  mmol/L


 


BUN   39 H   (9-20)  mg/dL


 


Glucose   120 H   (74-99)  mg/dL


 


Calcium   8.0 L   (8.4-10.2)  mg/dL


 


Iron    55 L  ()  ug/dL


 


% Saturation    13.71 L  (15.00-50.00)   


 


AST   73 H   (17-59)  U/L


 


ALT   62 H   (4-49)  U/L


 


Lactate Dehydrogenase    1042 H  (313-618)  U/L


 


Total Protein   5.7 L   (6.3-8.2)  g/dL


 


Total Protein (PEP)     (6.2-8.2)  g/dL


 


Albumin   3.2 L   (3.5-5.0)  g/dL


 


HDL Cholesterol  32.90 L    (40.00-60.00)  mg/dL


 


IgG     (700.0-1600.0)  mg/dL














  07/08/22 07/08/22 07/08/22 Range/Units





  16:47 16:47 16:47 


 


RBC     (4.30-5.90)  m/uL


 


Hgb     (13.0-17.5)  gm/dL


 


Hct     (39.0-53.0)  %


 


MCV     (80.0-100.0)  fL


 


MCH     (25.0-35.0)  pg


 


RDW     (11.5-15.5)  %


 


Plt Count     (150-450)  k/uL


 


Blast Cells %     %


 


Lymphocytes # (Manual)     (1.0-4.8)  k/uL


 


Myelocytes # (Manual)     (0)  k/uL


 


Blast Cells # (Man)     (0)  k/uL


 


Nucleated RBCs     (0-0)  /100 WBC


 


Pathologist Review     


 


Macrocytosis     


 


ESR  39 H    (0-15)  mm/hr


 


Retic Count  2.7 H    (0.5-2.0)  %


 


D-Dimer    2.00 H  (<0.60)  mg/L FEU


 


Potassium     (3.5-5.1)  mmol/L


 


Chloride     ()  mmol/L


 


BUN     (9-20)  mg/dL


 


Glucose     (74-99)  mg/dL


 


Calcium     (8.4-10.2)  mg/dL


 


Iron     ()  ug/dL


 


% Saturation     (15.00-50.00)   


 


AST     (17-59)  U/L


 


ALT     (4-49)  U/L


 


Lactate Dehydrogenase     (313-618)  U/L


 


Total Protein     (6.3-8.2)  g/dL


 


Total Protein (PEP)     (6.2-8.2)  g/dL


 


Albumin     (3.5-5.0)  g/dL


 


HDL Cholesterol     (40.00-60.00)  mg/dL


 


IgG   598.0 L   (700.0-1600.0)  mg/dL














  07/08/22 Range/Units





  16:47 


 


RBC   (4.30-5.90)  m/uL


 


Hgb   (13.0-17.5)  gm/dL


 


Hct   (39.0-53.0)  %


 


MCV   (80.0-100.0)  fL


 


MCH   (25.0-35.0)  pg


 


RDW   (11.5-15.5)  %


 


Plt Count   (150-450)  k/uL


 


Blast Cells %   %


 


Lymphocytes # (Manual)   (1.0-4.8)  k/uL


 


Myelocytes # (Manual)   (0)  k/uL


 


Blast Cells # (Man)   (0)  k/uL


 


Nucleated RBCs   (0-0)  /100 WBC


 


Pathologist Review   


 


Macrocytosis   


 


ESR   (0-15)  mm/hr


 


Retic Count   (0.5-2.0)  %


 


D-Dimer   (<0.60)  mg/L FEU


 


Potassium   (3.5-5.1)  mmol/L


 


Chloride   ()  mmol/L


 


BUN   (9-20)  mg/dL


 


Glucose   (74-99)  mg/dL


 


Calcium   (8.4-10.2)  mg/dL


 


Iron   ()  ug/dL


 


% Saturation   (15.00-50.00)   


 


AST   (17-59)  U/L


 


ALT   (4-49)  U/L


 


Lactate Dehydrogenase   (313-618)  U/L


 


Total Protein   (6.3-8.2)  g/dL


 


Total Protein (PEP)  5.4 L  (6.2-8.2)  g/dL


 


Albumin   (3.5-5.0)  g/dL


 


HDL Cholesterol   (40.00-60.00)  mg/dL


 


IgG   (700.0-1600.0)  mg/dL

## 2022-07-09 NOTE — P.PN
Subjective


Progress Note Date: 07/09/22





Hospital course:


62 years old gentleman who presented with a chief, no fall head injury.  Patient

apparently passed out 3 times in the last 3 days prior to the day of admission. 

He reports losing consciousness.  Patient at home is on Eliquis.  Patient past 

medical history significant for hypertension COPD history of DVT on Eliquis 

ongoing tobacco abuse.  Patient was admitted for syncope.  Patient had computed 

tomography scan echocardiogram was negative for any acute finding.  Chest CTA 

report mentions small pulmonary embolism cannot be excluded.  Lower extremity 

duplex ultrasound was negative, EKG showed questionable epileptiform discharges.

 Patient was started on Keppra by neurology





Patient was also noted to have macrocytic anemia and abnormal Pap.  No blast 

cells and immature cells term cytopenia, oncology was consulted and they're 

planning bone marrow biopsy, surgery consulted and they're planning colonoscopy 

and endoscopy for anemia workup





Subjective


Patient seen and evaluated at bedside, today patient does not report any 

worsening of his breathing or report any new significant chest pain.  Patient 

remains in no acute distress.  Patient questions and concerns addressed at 

bedside, proper counseling done.  Plan discussed with nursing staff.





Physical exam


General: non toxic, no acute distress, alert oriented to time place and person


Head: atraumatic, normocephalic, symmetric


Eyes: no lid lesion], anicteric sclera


Mouth: no lip lesion, mucus membranes moist


Cardiovascular: S1S2 reg rate and rhythm, no murmur, no gallop


Lungs: Bilateral equal air entry, no wheezing no rhonchi no crackles.


Abdominal: soft,  nontender to palpation, no guarding, no appreciable 

organomegaly


Ext: no gross muscle atrophy, no edema extremities warm to suppose a positive


Neuro: Alert oriented to time place and person, exam grossly nonfocal


Psych: Mood and affect appropriate, patient not so certain


Skin exam: No rashes no jaundice.





Assessment and plan





Syncopal episode


CT negative, infectious workup negative, Abnormal EEG, seizures cannot be 

excluded, anemia workup as mentioned below


Patient started on Keppra by neurology


MRI brain pending


The patient recommendation from neurology


Echocardiogram negative, plan for her 30 day event monitor with a cardiology 

follow-up on discharge


Appreciated recommendations from cardiology





Macrocytic Anemia and thrombocytopenia, abnormal per flow smear positive for 

blast cells along with other immature blood cells


General surgery consulted, plan for upper and lower endoscopy on Monday


Checking B12 folate patient was started


Patient has blasts on exam, Oncology consulted planning for bone marrow biopsy 

possibly for Tuesday





Questionable pulmonary embolism on CTA


Continue Eliquis next line further recommendations regarding anticoagulation 

from oncology pending





Abnormal ultrasound liver


Liver ultrasound showing cholelithiasis, mild gallbladder wall thickening 

consistent with possible cholecystitis


We'll check HIDA scan





Chronic nicotine dependence


Technique patch as needed





COPD, currently not in exacerbation 


Continue home inhalers





DVT prophylaxis: Eliquis


CODE STATUS: Full code


Discharge plan: To be determined pending clinical improvement, pending workup




















Objective





- Vital Signs


Vital signs: 


                                   Vital Signs











Temp  97.6 F   07/09/22 08:00


 


Pulse  56 L  07/09/22 11:22


 


Resp  18   07/09/22 09:14


 


BP  110/57   07/09/22 08:00


 


Pulse Ox  95   07/09/22 08:00


 


FiO2      








                                 Intake & Output











 07/08/22 07/09/22 07/09/22





 18:59 06:59 18:59


 


Intake Total 838  120


 


Output Total 500 600 


 


Balance 338 -600 120


 


Intake:   


 


  Oral 838  120


 


Output:   


 


  Urine 500 600 


 


Other:   


 


  Voiding Method Toilet Toilet Toilet





 Urinal Urinal Urinal














- Labs


CBC & Chem 7: 


                                 07/09/22 07:12





                                 07/09/22 07:12


Labs: 


                  Abnormal Lab Results - Last 24 Hours (Table)











  07/07/22 07/07/22 07/08/22 Range/Units





  14:48 23:00 02:13 


 


WBC     (3.8-10.6)  k/uL


 


RBC     (4.30-5.90)  m/uL


 


Hgb     (13.0-17.5)  gm/dL


 


Hct     (39.0-53.0)  %


 


MCV     (80.0-100.0)  fL


 


MCH     (25.0-35.0)  pg


 


RDW     (11.5-15.5)  %


 


Plt Count   79 L  72 L  (150-450)  k/uL


 


Blast Cells %   9 H*  4 H*  %


 


Lymphocytes # (Manual)   0.75 L  0.84 L  (1.0-4.8)  k/uL


 


Metamyelocytes # (Man)     (0)  k/uL


 


Blast Cells # (Man)   0.45 H  0.18 H  (0)  k/uL


 


Nucleated RBCs   1 H  1 H  (0-0)  /100 WBC


 


Pathologist Review  See comment A    


 


Macrocytosis     


 


ESR     (0-15)  mm/hr


 


Retic Count     (0.5-2.0)  %


 


APTT     (22.0-30.0)  sec


 


D-Dimer     (<0.60)  mg/L FEU


 


Chloride     ()  mmol/L


 


BUN     (9-20)  mg/dL


 


Calcium     (8.4-10.2)  mg/dL


 


Iron     ()  ug/dL


 


% Saturation     (15.00-50.00)   


 


AST     (17-59)  U/L


 


ALT     (4-49)  U/L


 


Lactate Dehydrogenase     (313-618)  U/L


 


Total Protein     (6.3-8.2)  g/dL


 


Total Protein (PEP)     (6.2-8.2)  g/dL


 


Albumin     (3.5-5.0)  g/dL


 


HDL Cholesterol     (40.00-60.00)  mg/dL


 


IgG     (700.0-1600.0)  mg/dL














  07/08/22 07/08/22 07/08/22 Range/Units





  02:13 16:47 16:47 


 


WBC     (3.8-10.6)  k/uL


 


RBC     (4.30-5.90)  m/uL


 


Hgb     (13.0-17.5)  gm/dL


 


Hct     (39.0-53.0)  %


 


MCV     (80.0-100.0)  fL


 


MCH     (25.0-35.0)  pg


 


RDW     (11.5-15.5)  %


 


Plt Count     (150-450)  k/uL


 


Blast Cells %     %


 


Lymphocytes # (Manual)     (1.0-4.8)  k/uL


 


Metamyelocytes # (Man)     (0)  k/uL


 


Blast Cells # (Man)     (0)  k/uL


 


Nucleated RBCs     (0-0)  /100 WBC


 


Pathologist Review     


 


Macrocytosis     


 


ESR    39 H  (0-15)  mm/hr


 


Retic Count    2.7 H  (0.5-2.0)  %


 


APTT     (22.0-30.0)  sec


 


D-Dimer     (<0.60)  mg/L FEU


 


Chloride     ()  mmol/L


 


BUN     (9-20)  mg/dL


 


Calcium     (8.4-10.2)  mg/dL


 


Iron   55 L   ()  ug/dL


 


% Saturation   13.71 L   (15.00-50.00)   


 


AST     (17-59)  U/L


 


ALT     (4-49)  U/L


 


Lactate Dehydrogenase   1042 H   (313-618)  U/L


 


Total Protein     (6.3-8.2)  g/dL


 


Total Protein (PEP)     (6.2-8.2)  g/dL


 


Albumin     (3.5-5.0)  g/dL


 


HDL Cholesterol  32.90 L    (40.00-60.00)  mg/dL


 


IgG     (700.0-1600.0)  mg/dL














  07/08/22 07/08/22 07/08/22 Range/Units





  16:47 16:47 16:47 


 


WBC     (3.8-10.6)  k/uL


 


RBC     (4.30-5.90)  m/uL


 


Hgb     (13.0-17.5)  gm/dL


 


Hct     (39.0-53.0)  %


 


MCV     (80.0-100.0)  fL


 


MCH     (25.0-35.0)  pg


 


RDW     (11.5-15.5)  %


 


Plt Count     (150-450)  k/uL


 


Blast Cells %     %


 


Lymphocytes # (Manual)     (1.0-4.8)  k/uL


 


Metamyelocytes # (Man)     (0)  k/uL


 


Blast Cells # (Man)     (0)  k/uL


 


Nucleated RBCs     (0-0)  /100 WBC


 


Pathologist Review     


 


Macrocytosis     


 


ESR     (0-15)  mm/hr


 


Retic Count     (0.5-2.0)  %


 


APTT     (22.0-30.0)  sec


 


D-Dimer   2.00 H   (<0.60)  mg/L FEU


 


Chloride     ()  mmol/L


 


BUN     (9-20)  mg/dL


 


Calcium     (8.4-10.2)  mg/dL


 


Iron     ()  ug/dL


 


% Saturation     (15.00-50.00)   


 


AST     (17-59)  U/L


 


ALT     (4-49)  U/L


 


Lactate Dehydrogenase     (313-618)  U/L


 


Total Protein     (6.3-8.2)  g/dL


 


Total Protein (PEP)    5.4 L  (6.2-8.2)  g/dL


 


Albumin     (3.5-5.0)  g/dL


 


HDL Cholesterol     (40.00-60.00)  mg/dL


 


IgG  598.0 L    (700.0-1600.0)  mg/dL














  07/09/22 07/09/22 07/09/22 Range/Units





  07:12 07:12 07:12 


 


WBC  2.8 L    (3.8-10.6)  k/uL


 


RBC  2.23 L    (4.30-5.90)  m/uL


 


Hgb  8.2 L    (13.0-17.5)  gm/dL


 


Hct  26.2 L    (39.0-53.0)  %


 


MCV  117.5 H    (80.0-100.0)  fL


 


MCH  36.9 H    (25.0-35.0)  pg


 


RDW  17.0 H    (11.5-15.5)  %


 


Plt Count  60 L    (150-450)  k/uL


 


Blast Cells %  2 H*    %


 


Lymphocytes # (Manual)     (1.0-4.8)  k/uL


 


Metamyelocytes # (Man)  0.03 H    (0)  k/uL


 


Blast Cells # (Man)  0.06 H    (0)  k/uL


 


Nucleated RBCs     (0-0)  /100 WBC


 


Pathologist Review     


 


Macrocytosis  Marked A    


 


ESR     (0-15)  mm/hr


 


Retic Count     (0.5-2.0)  %


 


APTT   21.2 L   (22.0-30.0)  sec


 


D-Dimer     (<0.60)  mg/L FEU


 


Chloride    109 H  ()  mmol/L


 


BUN    35 H  (9-20)  mg/dL


 


Calcium    8.0 L  (8.4-10.2)  mg/dL


 


Iron     ()  ug/dL


 


% Saturation     (15.00-50.00)   


 


AST    67 H  (17-59)  U/L


 


ALT    69 H  (4-49)  U/L


 


Lactate Dehydrogenase    1180 H  (313-618)  U/L


 


Total Protein    5.5 L  (6.3-8.2)  g/dL


 


Total Protein (PEP)     (6.2-8.2)  g/dL


 


Albumin    3.3 L  (3.5-5.0)  g/dL


 


HDL Cholesterol     (40.00-60.00)  mg/dL


 


IgG     (700.0-1600.0)  mg/dL

## 2022-07-09 NOTE — P.PN
Subjective


Progress Note Date: 07/09/22


Principal diagnosis: 





Abnormal CBC





Patient returning from Hida scan, he is diaphoretic but calm, RN at bedside





Objective





- Vital Signs


Vital signs: 


                                   Vital Signs











Temp  97.6 F   07/09/22 12:00


 


Pulse  51 L  07/09/22 12:00


 


Resp  17   07/09/22 14:29


 


BP  112/69   07/09/22 12:00


 


Pulse Ox  98   07/09/22 12:00


 


FiO2      








                                 Intake & Output











 07/08/22 07/09/22 07/09/22





 18:59 06:59 18:59


 


Intake Total 838  120


 


Output Total 500 600 


 


Balance 338 -600 120


 


Intake:   


 


  Oral 838  120


 


Output:   


 


  Urine 500 600 


 


Other:   


 


  Voiding Method Toilet Toilet Toilet





 Urinal Urinal Urinal














- Constitutional


General appearance: Present: cooperative, no acute distress





- EENT


Eyes: Present: EOMI


ENT: Present: NA/AT





- Neck


Neck: Present: normal ROM





- Respiratory


Respiratory: bilateral: CTA





- Cardiovascular


Rhythm: regularly irregular





- Gastrointestinal


General gastrointestinal: Present: distended, soft





- Integumentary


Integumentary: Present: pale





- Neurologic


Neurologic: Present: CNII-XII intact





- Musculoskeletal


Musculoskeletal: Present: generalized weakness





- Psychiatric


Psychiatric: Present: A&O x's 3





- Labs


CBC & Chem 7: 


                                 07/09/22 07:12





                                 07/09/22 07:12


Labs: 


                  Abnormal Lab Results - Last 24 Hours (Table)











  07/07/22 07/08/22 07/08/22 Range/Units





  14:48 02:13 16:47 


 


WBC     (3.8-10.6)  k/uL


 


RBC     (4.30-5.90)  m/uL


 


Hgb     (13.0-17.5)  gm/dL


 


Hct     (39.0-53.0)  %


 


MCV     (80.0-100.0)  fL


 


MCH     (25.0-35.0)  pg


 


RDW     (11.5-15.5)  %


 


Plt Count   72 L   (150-450)  k/uL


 


Blast Cells %   4 H*   %


 


Lymphocytes # (Manual)   0.84 L   (1.0-4.8)  k/uL


 


Metamyelocytes # (Man)     (0)  k/uL


 


Blast Cells # (Man)   0.18 H   (0)  k/uL


 


Nucleated RBCs   1 H   (0-0)  /100 WBC


 


Pathologist Review  See comment A    


 


Macrocytosis     


 


ESR     (0-15)  mm/hr


 


Retic Count     (0.5-2.0)  %


 


APTT     (22.0-30.0)  sec


 


D-Dimer     (<0.60)  mg/L FEU


 


Chloride     ()  mmol/L


 


BUN     (9-20)  mg/dL


 


Calcium     (8.4-10.2)  mg/dL


 


Iron    55 L  ()  ug/dL


 


% Saturation    13.71 L  (15.00-50.00)   


 


AST     (17-59)  U/L


 


ALT     (4-49)  U/L


 


Lactate Dehydrogenase    1042 H  (313-618)  U/L


 


Total Protein     (6.3-8.2)  g/dL


 


Total Protein (PEP)     (6.2-8.2)  g/dL


 


Albumin     (3.5-5.0)  g/dL


 


IgG     (700.0-1600.0)  mg/dL














  07/08/22 07/08/22 07/08/22 Range/Units





  16:47 16:47 16:47 


 


WBC     (3.8-10.6)  k/uL


 


RBC     (4.30-5.90)  m/uL


 


Hgb     (13.0-17.5)  gm/dL


 


Hct     (39.0-53.0)  %


 


MCV     (80.0-100.0)  fL


 


MCH     (25.0-35.0)  pg


 


RDW     (11.5-15.5)  %


 


Plt Count     (150-450)  k/uL


 


Blast Cells %     %


 


Lymphocytes # (Manual)     (1.0-4.8)  k/uL


 


Metamyelocytes # (Man)     (0)  k/uL


 


Blast Cells # (Man)     (0)  k/uL


 


Nucleated RBCs     (0-0)  /100 WBC


 


Pathologist Review     


 


Macrocytosis     


 


ESR  39 H    (0-15)  mm/hr


 


Retic Count  2.7 H    (0.5-2.0)  %


 


APTT     (22.0-30.0)  sec


 


D-Dimer    2.00 H  (<0.60)  mg/L FEU


 


Chloride     ()  mmol/L


 


BUN     (9-20)  mg/dL


 


Calcium     (8.4-10.2)  mg/dL


 


Iron     ()  ug/dL


 


% Saturation     (15.00-50.00)   


 


AST     (17-59)  U/L


 


ALT     (4-49)  U/L


 


Lactate Dehydrogenase     (313-618)  U/L


 


Total Protein     (6.3-8.2)  g/dL


 


Total Protein (PEP)     (6.2-8.2)  g/dL


 


Albumin     (3.5-5.0)  g/dL


 


IgG   598.0 L   (700.0-1600.0)  mg/dL














  07/08/22 07/09/22 07/09/22 Range/Units





  16:47 07:12 07:12 


 


WBC   2.8 L   (3.8-10.6)  k/uL


 


RBC   2.23 L   (4.30-5.90)  m/uL


 


Hgb   8.2 L   (13.0-17.5)  gm/dL


 


Hct   26.2 L   (39.0-53.0)  %


 


MCV   117.5 H   (80.0-100.0)  fL


 


MCH   36.9 H   (25.0-35.0)  pg


 


RDW   17.0 H   (11.5-15.5)  %


 


Plt Count   60 L   (150-450)  k/uL


 


Blast Cells %   2 H*   %


 


Lymphocytes # (Manual)     (1.0-4.8)  k/uL


 


Metamyelocytes # (Man)   0.03 H   (0)  k/uL


 


Blast Cells # (Man)   0.06 H   (0)  k/uL


 


Nucleated RBCs     (0-0)  /100 WBC


 


Pathologist Review     


 


Macrocytosis   Marked A   


 


ESR     (0-15)  mm/hr


 


Retic Count     (0.5-2.0)  %


 


APTT    21.2 L  (22.0-30.0)  sec


 


D-Dimer     (<0.60)  mg/L FEU


 


Chloride     ()  mmol/L


 


BUN     (9-20)  mg/dL


 


Calcium     (8.4-10.2)  mg/dL


 


Iron     ()  ug/dL


 


% Saturation     (15.00-50.00)   


 


AST     (17-59)  U/L


 


ALT     (4-49)  U/L


 


Lactate Dehydrogenase     (313-618)  U/L


 


Total Protein     (6.3-8.2)  g/dL


 


Total Protein (PEP)  5.4 L    (6.2-8.2)  g/dL


 


Albumin     (3.5-5.0)  g/dL


 


IgG     (700.0-1600.0)  mg/dL














  07/09/22 Range/Units





  07:12 


 


WBC   (3.8-10.6)  k/uL


 


RBC   (4.30-5.90)  m/uL


 


Hgb   (13.0-17.5)  gm/dL


 


Hct   (39.0-53.0)  %


 


MCV   (80.0-100.0)  fL


 


MCH   (25.0-35.0)  pg


 


RDW   (11.5-15.5)  %


 


Plt Count   (150-450)  k/uL


 


Blast Cells %   %


 


Lymphocytes # (Manual)   (1.0-4.8)  k/uL


 


Metamyelocytes # (Man)   (0)  k/uL


 


Blast Cells # (Man)   (0)  k/uL


 


Nucleated RBCs   (0-0)  /100 WBC


 


Pathologist Review   


 


Macrocytosis   


 


ESR   (0-15)  mm/hr


 


Retic Count   (0.5-2.0)  %


 


APTT   (22.0-30.0)  sec


 


D-Dimer   (<0.60)  mg/L FEU


 


Chloride  109 H  ()  mmol/L


 


BUN  35 H  (9-20)  mg/dL


 


Calcium  8.0 L  (8.4-10.2)  mg/dL


 


Iron   ()  ug/dL


 


% Saturation   (15.00-50.00)   


 


AST  67 H  (17-59)  U/L


 


ALT  69 H  (4-49)  U/L


 


Lactate Dehydrogenase  1180 H  (313-618)  U/L


 


Total Protein  5.5 L  (6.3-8.2)  g/dL


 


Total Protein (PEP)   (6.2-8.2)  g/dL


 


Albumin  3.3 L  (3.5-5.0)  g/dL


 


IgG   (700.0-1600.0)  mg/dL














Assessment and Plan


(1) Abnormal complete blood count


Narrative/Plan: 


COncern for primary acute bone marrow discorder


 - Bone Marrow Biopsy scheduled for Tuesday


Current Visit: Yes   Status: Acute   Code(s): R79.89 - OTHER SPECIFIED ABNORMAL 

FINDINGS OF BLOOD CHEMISTRY   SNOMED Code(s): 005394749


   





(2) Macrocytic anemia


Current Visit: Yes   Status: Acute   Code(s): D53.9 - NUTRITIONAL ANEMIA, 

UNSPECIFIED   SNOMED Code(s): 08336939


   





(3) Thrombocytopenia


Current Visit: Yes   Status: Acute   Code(s): D69.6 - THROMBOCYTOPENIA, 

UNSPECIFIED   SNOMED Code(s): 587040098


   





(4) Hypokalemia


Current Visit: Yes   Status: Acute   Code(s): E87.6 - HYPOKALEMIA   SNOMED 

Code(s): 15254479


   


Plan: 





Spoke with primary team regarding abnormal peripheral smear.  Marked 

Macrocytosis with peripheral blasts amongst other immature blood cells. Likely 

concern for underlying acute bone marrow issue





Bone Marrow Biopsy Scheduled for Tuesday


Full Macrocytic Work-up Ordered





Monitor closely for infection, bleeding, DIC. 


Replacement of electrolytes per primary team, Hypokalemia, check mag


If rapid increase in WBC over weekend and a blast crisis is presenting can start

hydrea

## 2022-07-10 LAB
ALBUMIN SERPL-MCNC: 3.1 G/DL (ref 3.5–5)
ALP SERPL-CCNC: 76 U/L (ref 38–126)
ALT SERPL-CCNC: 109 U/L (ref 4–49)
ANION GAP SERPL CALC-SCNC: 5 MMOL/L
AST SERPL-CCNC: 125 U/L (ref 17–59)
BLASTS # BLD MANUAL: 0.07 K/UL
BUN SERPL-SCNC: 25 MG/DL (ref 9–20)
CALCIUM SPEC-MCNC: 7.9 MG/DL (ref 8.4–10.2)
CELLS COUNTED: 200
CHLORIDE SERPL-SCNC: 111 MMOL/L (ref 98–107)
CO2 BLDA-SCNC: 28 MMOL/L (ref 19–24)
CO2 SERPL-SCNC: 27 MMOL/L (ref 22–30)
EOSINOPHIL # BLD MANUAL: 0.05 K/UL (ref 0–0.7)
ERYTHROCYTE [DISTWIDTH] IN BLOOD BY AUTOMATED COUNT: 2.21 M/UL (ref 4.3–5.9)
ERYTHROCYTE [DISTWIDTH] IN BLOOD: 17.4 % (ref 11.5–15.5)
GLUCOSE BLD-MCNC: 108 MG/DL (ref 70–110)
GLUCOSE SERPL-MCNC: 92 MG/DL (ref 74–99)
HCO3 BLDA-SCNC: 27 MMOL/L (ref 21–25)
HCT VFR BLD AUTO: 26 % (ref 39–53)
HGB BLD-MCNC: 8.4 GM/DL (ref 13–17.5)
LYMPHOCYTES # BLD MANUAL: 0.94 K/UL (ref 1–4.8)
MCH RBC QN AUTO: 38.1 PG (ref 25–35)
MCHC RBC AUTO-ENTMCNC: 32.5 G/DL (ref 31–37)
MCV RBC AUTO: 117.5 FL (ref 80–100)
MONOCYTES # BLD MANUAL: 0.36 K/UL (ref 0–1)
MYELOCYTES # BLD MANUAL: 0.02 K/UL
NEUTROPHILS NFR BLD MANUAL: 40 %
NEUTS SEG # BLD MANUAL: 1 K/UL (ref 1.3–7.7)
PCO2 BLDA: 37 MMHG (ref 35–45)
PH BLDA: 7.48 [PH] (ref 7.35–7.45)
PLATELET # BLD AUTO: 44 K/UL (ref 150–450)
PO2 BLDA: 79 MMHG (ref 83–108)
POTASSIUM SERPL-SCNC: 3.4 MMOL/L (ref 3.5–5.1)
PROT SERPL-MCNC: 5.3 G/DL (ref 6.3–8.2)
SODIUM SERPL-SCNC: 143 MMOL/L (ref 137–145)
WBC # BLD AUTO: 2.4 K/UL (ref 3.8–10.6)

## 2022-07-10 RX ADMIN — PANTOPRAZOLE SODIUM SCH MG: 40 INJECTION, POWDER, FOR SOLUTION INTRAVENOUS at 23:41

## 2022-07-10 RX ADMIN — OXYCODONE HYDROCHLORIDE AND ACETAMINOPHEN SCH: 10; 325 TABLET ORAL at 16:37

## 2022-07-10 RX ADMIN — IPRATROPIUM BROMIDE AND ALBUTEROL SULFATE SCH ML: .5; 3 SOLUTION RESPIRATORY (INHALATION) at 15:52

## 2022-07-10 RX ADMIN — IPRATROPIUM BROMIDE AND ALBUTEROL SULFATE SCH ML: .5; 3 SOLUTION RESPIRATORY (INHALATION) at 08:10

## 2022-07-10 RX ADMIN — OXYCODONE HYDROCHLORIDE AND ACETAMINOPHEN SCH EACH: 10; 325 TABLET ORAL at 07:55

## 2022-07-10 RX ADMIN — BUDESONIDE AND FORMOTEROL FUMARATE DIHYDRATE SCH PUFF: 80; 4.5 AEROSOL RESPIRATORY (INHALATION) at 08:10

## 2022-07-10 RX ADMIN — MONTELUKAST SODIUM SCH: 10 TABLET, FILM COATED ORAL at 23:37

## 2022-07-10 RX ADMIN — IPRATROPIUM BROMIDE AND ALBUTEROL SULFATE SCH: .5; 3 SOLUTION RESPIRATORY (INHALATION) at 20:36

## 2022-07-10 RX ADMIN — OXYCODONE HYDROCHLORIDE AND ACETAMINOPHEN SCH: 10; 325 TABLET ORAL at 23:49

## 2022-07-10 RX ADMIN — PREGABALIN SCH MG: 75 CAPSULE ORAL at 07:54

## 2022-07-10 RX ADMIN — PANTOPRAZOLE SODIUM SCH MG: 40 INJECTION, POWDER, FOR SOLUTION INTRAVENOUS at 07:54

## 2022-07-10 RX ADMIN — IPRATROPIUM BROMIDE AND ALBUTEROL SULFATE SCH ML: .5; 3 SOLUTION RESPIRATORY (INHALATION) at 11:49

## 2022-07-10 RX ADMIN — POTASSIUM CHLORIDE SCH MEQ: 20 TABLET, EXTENDED RELEASE ORAL at 07:57

## 2022-07-10 RX ADMIN — BACLOFEN SCH MG: 10 TABLET ORAL at 07:55

## 2022-07-10 RX ADMIN — BUDESONIDE AND FORMOTEROL FUMARATE DIHYDRATE SCH: 80; 4.5 AEROSOL RESPIRATORY (INHALATION) at 20:36

## 2022-07-10 NOTE — P.PN
Subjective


Progress Note Date: 07/10/22


I spoke with the overnight nurse around 6:30ish AM today and notified me that 

patient was more confused.  I asked her to get repeat CT head and load the 

patient with Keppra 1500mg once.


No further passing out episodes per nursing staff.








Objective





- Vital Signs


Vital signs: 


                                   Vital Signs











Temp  97.4 F L  07/10/22 11:15


 


Pulse  58 L  07/10/22 12:03


 


Resp  12   07/10/22 11:15


 


BP  98/64   07/10/22 11:15


 


Pulse Ox  96   07/10/22 11:15


 


FiO2      








                                 Intake & Output











 07/09/22 07/10/22 07/10/22





 18:59 06:59 18:59


 


Intake Total 670  0


 


Output Total 800  


 


Balance -130  0


 


Intake:   


 


  Oral 670  0


 


Output:   


 


  Urine 800  


 


Other:   


 


  Voiding Method Toilet Urinal 





 Urinal  


 


  # Voids  3 














- Exam





GENERAL: The patient is lying in bed and is  in mild acute distress.








NEUROLOGICAL:


Higher mental function: The patient is drowsy but is awakeable to voice on 

repeated times.  Is oriented to self.  He stated the year is 2020.  I ask him 

multiple times name of place and with options he correctly stated he was in the 

hospital but after repeated questioning.  He was repeating the  same things over

(like "the year is 2020" even though I was asking him about something else).  

Patient is answering questions slowly.  Patient is following simple commands.  


Cranial nerves: The pupils are round, equal and reactive to light .  No facial 

weakness.  No dysarthria.  


Motor: Gait is deferred because of his condition. The strength is limited 

because of his cooperation.  But lifting uppers above gravity.  Has edema in 

right lower with reddish discoloration. 


. 





Some of the workup in our hospital during this admission consisted of:


Orthostatic vitals as this upon blood pressure is 116/69, sitting is 124/90 and 

standing is 130/59 with a heart rate was never checked but the blood pressure 

there is no drastic change.


Patient's white blood cells 4.4 hemoglobin is 8.5 and his blast cells was 14%


Potassium 2.8, BUN is 39, glucose initial is 113, calcium is 8.1, AST 73 and ALT

of 63.


CLEMENTINA is negative.


Corona virus is nondetected.


CT of the head is reported as no acute intracranial process.  MRI can be 

performed as clinically indicated.  I personally reviewed the CT of the head and

I agree with report.


CT cervical spine was reported as degenerative disc changes with anterior 

cervical fusion.  No acute osseous abnormality.


Repeat CT head w/o: Reported as no acute intracranial hemorrhage or midline 

shift.  No significant change from prior.  I personally reviewed CT head and and

I agree with the report


Routine EEG is abnormal.  The back was on the suggestive of mild encephalopathy.

 There are questionable rare frontal temporal central epileptiform discharges.  

Otherwise there is no seizure or focal slowing detected on the study.








- Labs


CBC & Chem 7: 


                                 07/10/22 07:42





                                 07/10/22 07:42


Labs: 


                  Abnormal Lab Results - Last 24 Hours (Table)











  07/10/22 07/10/22 Range/Units





  07:42 07:42 


 


WBC  2.4 L   (3.8-10.6)  k/uL


 


RBC  2.21 L   (4.30-5.90)  m/uL


 


Hgb  8.4 L   (13.0-17.5)  gm/dL


 


Hct  26.0 L   (39.0-53.0)  %


 


MCV  117.5 H   (80.0-100.0)  fL


 


MCH  38.1 H   (25.0-35.0)  pg


 


RDW  17.4 H   (11.5-15.5)  %


 


Plt Count  44 L   (150-450)  k/uL


 


Blast Cells %  3 H*   %


 


Neutrophils # (Manual)  1.00 L   (1.3-7.7)  k/uL


 


Lymphocytes # (Manual)  0.94 L   (1.0-4.8)  k/uL


 


Myelocytes # (Manual)  0.02 H   (0)  k/uL


 


Blast Cells # (Man)  0.07 H   (0)  k/uL


 


Macrocytosis  Marked A   


 


Potassium   3.4 L  (3.5-5.1)  mmol/L


 


Chloride   111 H  ()  mmol/L


 


BUN   25 H  (9-20)  mg/dL


 


Calcium   7.9 L  (8.4-10.2)  mg/dL


 


AST   125 H  (17-59)  U/L


 


ALT   109 H  (4-49)  U/L


 


Total Protein   5.3 L  (6.3-8.2)  g/dL


 


Albumin   3.1 L  (3.5-5.0)  g/dL








                      Microbiology - Last 24 Hours (Table)











 07/08/22 17:01 Blood Culture - Preliminary





 Blood    No Growth after 24 hours


 


 07/08/22 16:47 Blood Culture - Preliminary





 Blood    No Growth after 24 hours














Assessment and Plan


Assessment: 





Recurrent Syncopal-like episodes for past 5 days seems are likely seizure (has l

oss of consciousness, urinary incontinence, tongue bite and some episodes 

shaking of uppers that are uncontrolled then proceeded with LOC.  Had episodes 

while walking or sitting down). EEG revealed questionable rare fronto-temporal 

central epileptiform discharges.  No seizures.


Encephalopathy due to multifactorial: likely seizures, metabolic encephlopathy 

and leukemia/myelodysplastic syndrome. 


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  


Chronic neck pain


Chronic lower back pain


Chronic anemia


Hypertension


History of DVT on eliquis


COPD


History of hepatitis 


Nicotine use





Plan: 


Yesterday I loaded the patient with Keppra 1500 mg once been started the patient

on Keppra 500 every 12 hours.  I'll increase the Keppra to 750 every 12 hours 

since he's having episode of confusion.


Gave one time Ativan 1mg.


Gave loading Keppra 1500mg once today and increased maintainace from 750mg bid 

to 1gm bid (was not on antiepileptic drugs prior to this).


Ordered CT head w/ today: : Is reported as no acute intracranial process and no 

evidences to suggest metastatic disease.  Consider MRI with IV contrast


Cannot obtain MRI of the brain since the patient has IVC filter.  Recommend MRI 

of the brain with and without as an outpatient that is a IVC filter compatible.


Will get repeat EEG tomorrow and will attempt to coordinate 2.5 hours EEG while 

he is inpatient.    


Oncology team is on board is seems the patient is scheduled for bone marrow 

biopsy this Tuesday


Patient is on fall precautions


On seizure precautions seizure pads.


Cardiology is consulted.


Patient was counseled on tobacco cessation.


Will defer the rest of medical management to the primary team.


Upon discharge, patient needs to follow-up with neurologist as outpatient within

1-2 weeks..


Condition is very guarded.





The plan is discussed with primary team and his nurse.





John Cavanaugh M.D.


Neuro-Hospitalist


Time with Patient: Less than 30

## 2022-07-10 NOTE — P.PN
Subjective


Progress Note Date: 07/10/22


Principal diagnosis: 





Anemia, GI bleed





Patient somewhat confused today.  Patient had a CAT scan of the brain this 

morning.  Neurology suspecting patient may be having seizures.  No rectal 

bleeding or melena.  Labs noted.  Oncology advising we hold off on the endoscopy

at this time until further workup of possible leukemia is completed.





Objective





- Vital Signs


Vital signs: 


                                   Vital Signs











Temp  97.9 F   07/10/22 07:47


 


Pulse  56 L  07/10/22 08:24


 


Resp  14   07/10/22 07:47


 


BP  145/69   07/10/22 07:47


 


Pulse Ox  93 L  07/10/22 07:47


 


FiO2      








                                 Intake & Output











 07/09/22 07/10/22 07/10/22





 18:59 06:59 18:59


 


Intake Total 670  0


 


Output Total 800  


 


Balance -130  0


 


Intake:   


 


  Oral 670  0


 


Output:   


 


  Urine 800  


 


Other:   


 


  Voiding Method Toilet Urinal 





 Urinal  


 


  # Voids  3 














- Exam





Abdomen: Soft, nontender, nondistended





- Labs


CBC & Chem 7: 


                                 07/10/22 07:42





                                 07/10/22 07:42


Labs: 


                  Abnormal Lab Results - Last 24 Hours (Table)











  07/09/22 07/10/22 07/10/22 Range/Units





  07:12 07:42 07:42 


 


WBC   2.4 L   (3.8-10.6)  k/uL


 


RBC   2.21 L   (4.30-5.90)  m/uL


 


Hgb   8.4 L   (13.0-17.5)  gm/dL


 


Hct   26.0 L   (39.0-53.0)  %


 


MCV   117.5 H   (80.0-100.0)  fL


 


MCH   38.1 H   (25.0-35.0)  pg


 


RDW   17.4 H   (11.5-15.5)  %


 


Plt Count  60 L    (150-450)  k/uL


 


Blast Cells %  2 H*    %


 


Metamyelocytes # (Man)  0.03 H    (0)  k/uL


 


Blast Cells # (Man)  0.06 H    (0)  k/uL


 


Macrocytosis   Marked A   


 


Potassium    3.4 L  (3.5-5.1)  mmol/L


 


Chloride    111 H  ()  mmol/L


 


BUN    25 H  (9-20)  mg/dL


 


Calcium    7.9 L  (8.4-10.2)  mg/dL


 


AST    125 H  (17-59)  U/L


 


ALT    109 H  (4-49)  U/L


 


Total Protein    5.3 L  (6.3-8.2)  g/dL


 


Albumin    3.1 L  (3.5-5.0)  g/dL








                      Microbiology - Last 24 Hours (Table)











 07/08/22 17:01 Blood Culture - Preliminary





 Blood    No Growth after 24 hours


 


 07/08/22 16:47 Blood Culture - Preliminary





 Blood    No Growth after 24 hours














Assessment and Plan


(1) Anemia


Narrative/Plan: 


62-year-old male with anemia.  We'll hold off on upper and lower endoscopy until

oncology requests that it be performed.  Await bone marrow biopsy on Tuesday.


Current Visit: Yes   Status: Acute   Code(s): D64.9 - ANEMIA, UNSPECIFIED   

SNOMED Code(s): 997741554

## 2022-07-10 NOTE — CT
EXAMINATION TYPE: CT brain w con

CT DLP: 1099.4 mGycm, Automated exposure control for dose reduction was used.

 

DATE OF EXAM: 7/10/2022 11:53 AM

 

COMPARISON: CT brain 7/10/2022..  

 

CLINICAL INDICATION:Male, 62 years old with history of confusion, r/o mets, 

 

TECHNIQUE: Axial CT images of the brain were obtained followed by contrast enhanced axial images of t
he brain with 100 cc of ISO-view 370 IV contrast. One or more CT dose reduction strategies were utili
zed during this examination. 

FINDINGS:

Extra-axial spaces: No abnormal extra-axial fluid collections.

Ventricular system: Within normal limits

Cerebral parenchyma: No acute intraparenchymal hemorrhage or mass effect.  The gray-white junction is
 well differentiated. No abnormal enhancement is seen after the administration of intravenous contras
t.

Cerebellum: Unremarkable.

Mass effect: No evidence of midline shift.

Intracranial vasculature: unremarkable

Soft tissues: Normal.

Calvarium/osseous structures: No depressed skull fracture.

Paranasal sinuses and mastoid air cells: Clear.

Visualized orbits: Orbital contents are intact.

 

IMPRESSION:

No acute intracranial process and no evidence to suggest metastatic disease. Consider MRI with IV con
trast.

## 2022-07-10 NOTE — P.PN
Subjective


Progress Note Date: 07/10/22


Principal diagnosis: 





Abnormal CBC





No transfusion needed. Suboptimal B12 and folate, supp ordered





Objective





- Vital Signs


Vital signs: 


                                   Vital Signs











Temp  97.9 F   07/10/22 16:00


 


Pulse  56 L  07/10/22 16:03


 


Resp  12   07/10/22 16:00


 


BP  131/76   07/10/22 16:00


 


Pulse Ox  93 L  07/10/22 16:00


 


FiO2      








                                 Intake & Output











 07/10/22 07/10/22 07/11/22





 06:59 18:59 06:59


 


Intake Total  100 0


 


Balance  100 0


 


Intake:   


 


  Intake, IV Titration  100 





  Amount   


 


    levETIRAcetam IV 1,500 mg  100 





    In Saline 1 100ml.bag @   





    400 mls/hr IVPB ONCE STA   





    Rx#:[id number]   


 


  Oral  0 0


 


Other:   


 


  Voiding Method Urinal  


 


  # Voids 3 3 














- Exam





- Constitutional


General appearance: Present: cooperative, no acute distress





- EENT


Eyes: Present: EOMI


ENT: Present: NA/AT





- Neck


Neck: Present: normal ROM





- Respiratory


Respiratory: bilateral: CTA





- Cardiovascular


Rhythm: regularly irregular





- Gastrointestinal


General gastrointestinal: Present: distended, soft





- Integumentary


Integumentary: Present: pale





- Neurologic


Neurologic: Present: CNII-XII intact





- Musculoskeletal


Musculoskeletal: Present: generalized weakness





- Psychiatric


Psychiatric: Present: A&O x's 3








- Labs


CBC & Chem 7: 


                                 07/10/22 07:42





                                 07/10/22 07:42


Labs: 


                  Abnormal Lab Results - Last 24 Hours (Table)











  07/10/22 07/10/22 07/10/22 Range/Units





  07:42 07:42 21:22 


 


WBC  2.4 L    (3.8-10.6)  k/uL


 


RBC  2.21 L    (4.30-5.90)  m/uL


 


Hgb  8.4 L    (13.0-17.5)  gm/dL


 


Hct  26.0 L    (39.0-53.0)  %


 


MCV  117.5 H    (80.0-100.0)  fL


 


MCH  38.1 H    (25.0-35.0)  pg


 


RDW  17.4 H    (11.5-15.5)  %


 


Plt Count  44 L    (150-450)  k/uL


 


Blast Cells %  3 H*    %


 


Neutrophils # (Manual)  1.00 L    (1.3-7.7)  k/uL


 


Lymphocytes # (Manual)  0.94 L    (1.0-4.8)  k/uL


 


Myelocytes # (Manual)  0.02 H    (0)  k/uL


 


Blast Cells # (Man)  0.07 H    (0)  k/uL


 


Macrocytosis  Marked A    


 


ABG pH    7.48 H  (7.35-7.45)  


 


ABG pO2    79 L  ()  mmHg


 


ABG HCO3    27 H  (21-25)  mmol/L


 


ABG Total CO2    28 H  (19-24)  mmol/L


 


Potassium   3.4 L   (3.5-5.1)  mmol/L


 


Chloride   111 H   ()  mmol/L


 


BUN   25 H   (9-20)  mg/dL


 


Calcium   7.9 L   (8.4-10.2)  mg/dL


 


AST   125 H   (17-59)  U/L


 


ALT   109 H   (4-49)  U/L


 


Total Protein   5.3 L   (6.3-8.2)  g/dL


 


Albumin   3.1 L   (3.5-5.0)  g/dL








                      Microbiology - Last 24 Hours (Table)











 07/08/22 16:47 Blood Culture - Preliminary





 Blood    No Growth after 48 hours


 


 07/08/22 17:01 Blood Culture - Preliminary





 Blood    No Growth after 48 hours














Assessment and Plan


(1) Abnormal complete blood count


Narrative/Plan: 


COncern for primary acute bone marrow discorder


 - Bone Marrow Biopsy scheduled for Tuesday


LDH Elevated, B12 and FOlate suboptimal and supplement ordered





Neutropenia, no growth factor with concern of primary bone marrow disorder


Current Visit: Yes   Status: Acute   Code(s): R79.89 - OTHER SPECIFIED ABNORMAL 

FINDINGS OF BLOOD CHEMISTRY   SNOMED Code(s): 633700765


   





(2) Macrocytic anemia


Narrative/Plan: 


macrocytosis


Current Visit: Yes   Status: Acute   Code(s): D53.9 - NUTRITIONAL ANEMIA, 

UNSPECIFIED   SNOMED Code(s): 28684345


   





(3) Thrombocytopenia


Narrative/Plan: 


60K if drops below 50K, NO ASA, NSAIS or AC therapy


Monitor closely for bleeding


Current Visit: Yes   Status: Acute   Code(s): D69.6 - THROMBOCYTOPENIA, 

UNSPECIFIED   SNOMED Code(s): 019012347


   





(4) Hypokalemia


Current Visit: Yes   Status: Acute   Code(s): E87.6 - HYPOKALEMIA   SNOMED 

Code(s): 70082952


   


Plan: 





Spoke with primary team regarding abnormal peripheral smear.  Marked 

Macrocytosis with peripheral blasts amongst other immature blood cells. Likely 

concern for underlying acute bone marrow issue





Bone Marrow Biopsy Scheduled for Tuesday


Full Macrocytic Work-up Ordered





Monitor closely for infection, bleeding, DIC. 


Replacement of electrolytes per primary team, Hypokalemia, check mag


If rapid increase in WBC over weekend and a blast crisis is presenting can start

hydrea


If transfusion support needed please transfuse irradiated only PRBC, suspected 

acute bone marrow disorder


No GCSF 





Bone marrow biopsy will be completed on Tuesday, patient aware. We discussed 

risks and benefits and procedure. All questions answered.

## 2022-07-10 NOTE — XR
EXAMINATION TYPE: XR chest 1V portable

 

DATE OF EXAM: 7/10/2022

 

COMPARISON: 7/7/2022

 

HISTORY: Pneumonia. Chest pain

 

TECHNIQUE: Single view

 

FINDINGS: Heart is normal. Lungs are clear of consolidation. There are no hilar masses. No heart fail
ure seen. No definite pleural effusion. There is cervical spine fusion surgery.

 

 

 

IMPRESSION: No active cardiopulmonary disease. No adverse change.

## 2022-07-10 NOTE — P.PN
Subjective


Progress Note Date: 07/10/22





Hospital course:


62 years old gentleman who presented with a chief, no fall head injury.  Patient

apparently passed out 3 times in the last 3 days prior to the day of admission. 

He reports losing consciousness.  Patient at home is on Eliquis.  Patient past 

medical history significant for hypertension COPD history of DVT on Eliquis 

ongoing tobacco abuse.  Patient was admitted for syncope.  Patient had computed 

tomography scan echocardiogram was negative for any acute finding.  Chest CTA 

report mentions small pulmonary embolism cannot be excluded.  Lower extremity 

duplex ultrasound was negative, EKG showed questionable epileptiform discharges.

 Patient was started on Keppra by neurology





Patient was also noted to have macrocytic anemia and abnormal peripheral smear 

with blast cells and immature cells term cytopenia, oncology was consulted and 

they're planning bone marrow biopsy, surgery consulted and they're planning 

colonoscopy and endoscopy for anemia workup





Subjective


Patient seen and evaluated at bedside, He is confused 





Physical exam


General: Patient is confused today 


Head: atraumatic, normocephalic, symmetric


Eyes: no lid lesion], anicteric sclera


Mouth: no lip lesion, mucus membranes moist


Cardiovascular: S1S2 reg rate and rhythm, no murmur, no gallop


Lungs: Bilateral equal air entry, no wheezing no rhonchi no crackles.


Abdominal: soft,  nontender to palpation, no guarding, no appreciable 

organomegaly


Ext: no gross muscle atrophy, no edema extremities warm to suppose a positive


Neuro: Alert oriented  x 1-2 , nonfocal exam





Assessment and plan





Syncopal episode with Encephalopathy 


CT negative, infectious workup negative, Abnormal EEG, seizures cannot be 

excluded, anemia workup as mentioned below


Patient started on Keppra by neurology


MRI couldnt be done per neurology as pt had IVC filter 


EEG 2.5 hours pending 


Will DC baclofen, pregabalin prednisone as these can affect mental status 


Echocardiogram negative, plan for her 30 day event monitor with a cardiology 

follow-up on discharge


Appreciated recommendations from Neurology and cardiology





Macrocytic Anemia and thrombocytopenia, abnormal per flow smear positive for 

blast cells along with other immature blood cells


Checking B12 folate patient was started


Patient has blasts on exam, Oncology consulted planning for bone marrow biopsy 

possibly for Monday/ Tuesday


General surgery consulted, Inital plan was for upper and lower endoscopy on 

Monday, Currently on hold as Hb stable and no signs of active bleeding 





Questionable pulmonary embolism on CTA


Continue Eliquis next line further recommendations regarding anticoagulation 

from oncology pending








COPD, currently not in exacerbation 


Continue home inhalers


Initally was started on oral prednisone , will dc 





Abnormal ultrasound liver


Liver ultrasound showing cholelithiasis, mild gallbladder wall thickening 

consistent with possible cholecystitis


HIDA negative 





Chronic nicotine dependence


Nicotine patch as needed








DVT prophylaxis: Eliquis


CODE STATUS: Full code


Discharge plan: To be determined pending clinical improvement, pending workup











Objective





- Vital Signs


Vital signs: 


                                   Vital Signs











Temp  97.4 F L  07/10/22 11:15


 


Pulse  58 L  07/10/22 12:03


 


Resp  12   07/10/22 11:15


 


BP  98/64   07/10/22 11:15


 


Pulse Ox  96   07/10/22 11:15


 


FiO2      








                                 Intake & Output











 07/09/22 07/10/22 07/10/22





 18:59 06:59 18:59


 


Intake Total 670  0


 


Output Total 800  


 


Balance -130  0


 


Intake:   


 


  Oral 670  0


 


Output:   


 


  Urine 800  


 


Other:   


 


  Voiding Method Toilet Urinal 





 Urinal  


 


  # Voids  3 














- Labs


CBC & Chem 7: 


                                 07/10/22 07:42





                                 07/10/22 07:42


Labs: 


                  Abnormal Lab Results - Last 24 Hours (Table)











  07/10/22 07/10/22 Range/Units





  07:42 07:42 


 


WBC  2.4 L   (3.8-10.6)  k/uL


 


RBC  2.21 L   (4.30-5.90)  m/uL


 


Hgb  8.4 L   (13.0-17.5)  gm/dL


 


Hct  26.0 L   (39.0-53.0)  %


 


MCV  117.5 H   (80.0-100.0)  fL


 


MCH  38.1 H   (25.0-35.0)  pg


 


RDW  17.4 H   (11.5-15.5)  %


 


Plt Count  44 L   (150-450)  k/uL


 


Blast Cells %  3 H*   %


 


Neutrophils # (Manual)  1.00 L   (1.3-7.7)  k/uL


 


Lymphocytes # (Manual)  0.94 L   (1.0-4.8)  k/uL


 


Myelocytes # (Manual)  0.02 H   (0)  k/uL


 


Blast Cells # (Man)  0.07 H   (0)  k/uL


 


Macrocytosis  Marked A   


 


Potassium   3.4 L  (3.5-5.1)  mmol/L


 


Chloride   111 H  ()  mmol/L


 


BUN   25 H  (9-20)  mg/dL


 


Calcium   7.9 L  (8.4-10.2)  mg/dL


 


AST   125 H  (17-59)  U/L


 


ALT   109 H  (4-49)  U/L


 


Total Protein   5.3 L  (6.3-8.2)  g/dL


 


Albumin   3.1 L  (3.5-5.0)  g/dL








                      Microbiology - Last 24 Hours (Table)











 07/08/22 17:01 Blood Culture - Preliminary





 Blood    No Growth after 24 hours


 


 07/08/22 16:47 Blood Culture - Preliminary





 Blood    No Growth after 24 hours

## 2022-07-10 NOTE — P.PN
Progress Note - Text


Progress Note Date: 07/10/22


I re-exaimined the patient at bedside and he continues to be confused.  He was 

able to tell me his name, stated 2018 but was repeating 2020.


He could not tell me name of place.  





Because of his continued confusion which seems worse compared to presentation, I

am concerned about nonconvulsive status.  I loaded patient with Vimpat 200mg 

once.  


I asked the primary team to start process of transferring him for long term EEG 

([facility]) but if unable to be transferred and patient is still in our 

facility by tomorrow he will have STAT EEG and will assess if he is seizing or 

not.





The plan is discussed with primary team and his nurse.

## 2022-07-10 NOTE — P.EN
transfer center called, patient accepted, no beds available at this time . they 

will check with us inthe morning

## 2022-07-10 NOTE — CT
EXAMINATION TYPE: CT brain wo con

 

DATE OF EXAM: 7/10/2022

 

HISTORY: altered mental status

 

CT DLP: 1099.4 mGycm.  Automated Exposure Control for Dose Reduction was Utilized.

 

TECHNIQUE: CT scan of the head is performed without contrast.

 

COMPARISON: CT brain 3 days ago.

 

FINDINGS:   There is no acute intracranial hemorrhage or midline shift identified. There is mild diff
use ventricular and sulcal prominence consistent with diffuse age-related cerebral atrophy. Gray-whit
e matter differentiation is maintained.  Right-sided nasal septal deviation redemonstrated. The globe
s are intact and the visualized sinuses are clear.   

 

IMPRESSION:  No acute intracranial hemorrhage or midline shift. No significant change from prior.

## 2022-07-11 LAB
ALBUMIN SERPL ELPH-MCNC: 3.13 G/DL (ref 3.8–4.9)
ALBUMIN SERPL-MCNC: 3.4 G/DL (ref 3.5–5)
ALP SERPL-CCNC: 92 U/L (ref 38–126)
ALT SERPL-CCNC: 122 U/L (ref 4–49)
ANION GAP SERPL CALC-SCNC: 4 MMOL/L
AST SERPL-CCNC: 105 U/L (ref 17–59)
BLASTS # BLD MANUAL: 0.18 K/UL
BUN SERPL-SCNC: 13 MG/DL (ref 9–20)
CALCIUM SPEC-MCNC: 8.1 MG/DL (ref 8.4–10.2)
CELLS COUNTED: 200
CHLORIDE SERPL-SCNC: 111 MMOL/L (ref 98–107)
CO2 SERPL-SCNC: 27 MMOL/L (ref 22–30)
ERYTHROCYTE [DISTWIDTH] IN BLOOD BY AUTOMATED COUNT: 2.32 M/UL (ref 4.3–5.9)
ERYTHROCYTE [DISTWIDTH] IN BLOOD: 16.3 % (ref 11.5–15.5)
GAMMA GLOB SERPL ELPH-MCNC: 0.58 G/DL (ref 0.7–1.5)
GLUCOSE SERPL-MCNC: 98 MG/DL (ref 74–99)
HCT VFR BLD AUTO: 26.3 % (ref 39–53)
HGB BLD-MCNC: 8.6 GM/DL (ref 13–17.5)
INR PPP: 1.1 (ref ?–1.2)
KAPPA LC FREE SER NEPH-MCNC: 1.17 MG/DL (ref 0.33–1.94)
LAMBDA LC FREE SER NEPH-MCNC: 0.85 MG/DL (ref 0.57–2.63)
LYMPHOCYTES # BLD MANUAL: 1.17 K/UL (ref 1–4.8)
MAGNESIUM SPEC-SCNC: 2 MG/DL (ref 1.6–2.3)
MCH RBC QN AUTO: 37.1 PG (ref 25–35)
MCHC RBC AUTO-ENTMCNC: 32.8 G/DL (ref 31–37)
MCV RBC AUTO: 113.3 FL (ref 80–100)
MONOCYTES # BLD MANUAL: 0.39 K/UL (ref 0–1)
NEUTROPHILS NFR BLD MANUAL: 43 %
NEUTS SEG # BLD MANUAL: 1.29 K/UL (ref 1.3–7.7)
PH UR: 7.5 [PH] (ref 5–8)
PLATELET # BLD AUTO: 60 K/UL (ref 150–450)
POTASSIUM SERPL-SCNC: 3.3 MMOL/L (ref 3.5–5.1)
PROT SERPL-MCNC: 5.7 G/DL (ref 6.3–8.2)
PT BLD: 12 SEC (ref 9–12)
RBC UR QL: 7 /HPF (ref 0–5)
SODIUM SERPL-SCNC: 142 MMOL/L (ref 137–145)
SP GR UR: 1.01 (ref 1–1.03)
SQUAMOUS UR QL AUTO: <1 /HPF (ref 0–4)
UROBILINOGEN UR QL STRIP: <2 MG/DL (ref ?–2)
WBC # BLD AUTO: 3 K/UL (ref 3.8–10.6)
WBC # UR AUTO: 1 /HPF (ref 0–5)

## 2022-07-11 RX ADMIN — BUDESONIDE AND FORMOTEROL FUMARATE DIHYDRATE SCH: 80; 4.5 AEROSOL RESPIRATORY (INHALATION) at 20:55

## 2022-07-11 RX ADMIN — MONTELUKAST SODIUM SCH MG: 10 TABLET, FILM COATED ORAL at 22:10

## 2022-07-11 RX ADMIN — OXYCODONE HYDROCHLORIDE AND ACETAMINOPHEN SCH: 10; 325 TABLET ORAL at 09:30

## 2022-07-11 RX ADMIN — OXYCODONE HYDROCHLORIDE AND ACETAMINOPHEN SCH EACH: 10; 325 TABLET ORAL at 22:10

## 2022-07-11 RX ADMIN — LEVETIRACETAM SCH MLS/HR: 100 INJECTION, SOLUTION, CONCENTRATE INTRAVENOUS at 20:39

## 2022-07-11 RX ADMIN — IPRATROPIUM BROMIDE AND ALBUTEROL SULFATE SCH: .5; 3 SOLUTION RESPIRATORY (INHALATION) at 11:40

## 2022-07-11 RX ADMIN — IPRATROPIUM BROMIDE AND ALBUTEROL SULFATE SCH ML: .5; 3 SOLUTION RESPIRATORY (INHALATION) at 08:04

## 2022-07-11 RX ADMIN — LEVETIRACETAM SCH: 10 INJECTION INTRAVENOUS at 05:45

## 2022-07-11 RX ADMIN — IPRATROPIUM BROMIDE AND ALBUTEROL SULFATE SCH: .5; 3 SOLUTION RESPIRATORY (INHALATION) at 16:39

## 2022-07-11 RX ADMIN — HALOPERIDOL LACTATE PRN MG: 5 INJECTION, SOLUTION INTRAMUSCULAR at 21:58

## 2022-07-11 RX ADMIN — FOLIC ACID SCH: 1 TABLET ORAL at 09:30

## 2022-07-11 RX ADMIN — LORAZEPAM PRN MG: 2 INJECTION INTRAMUSCULAR; INTRAVENOUS at 20:09

## 2022-07-11 RX ADMIN — BUDESONIDE AND FORMOTEROL FUMARATE DIHYDRATE SCH: 80; 4.5 AEROSOL RESPIRATORY (INHALATION) at 08:05

## 2022-07-11 RX ADMIN — HALOPERIDOL LACTATE PRN MG: 5 INJECTION, SOLUTION INTRAMUSCULAR at 10:02

## 2022-07-11 RX ADMIN — PANTOPRAZOLE SODIUM SCH MG: 40 INJECTION, POWDER, FOR SOLUTION INTRAVENOUS at 20:09

## 2022-07-11 RX ADMIN — IPRATROPIUM BROMIDE AND ALBUTEROL SULFATE SCH: .5; 3 SOLUTION RESPIRATORY (INHALATION) at 08:09

## 2022-07-11 RX ADMIN — IPRATROPIUM BROMIDE AND ALBUTEROL SULFATE SCH: .5; 3 SOLUTION RESPIRATORY (INHALATION) at 20:55

## 2022-07-11 RX ADMIN — LORAZEPAM PRN MG: 2 INJECTION INTRAMUSCULAR; INTRAVENOUS at 14:06

## 2022-07-11 RX ADMIN — POTASSIUM CHLORIDE SCH: 20 TABLET, EXTENDED RELEASE ORAL at 09:30

## 2022-07-11 RX ADMIN — PANTOPRAZOLE SODIUM SCH: 40 INJECTION, POWDER, FOR SOLUTION INTRAVENOUS at 09:30

## 2022-07-11 RX ADMIN — HALOPERIDOL LACTATE PRN MG: 5 INJECTION, SOLUTION INTRAMUSCULAR at 17:34

## 2022-07-11 RX ADMIN — LORAZEPAM PRN MG: 2 INJECTION INTRAMUSCULAR; INTRAVENOUS at 09:25

## 2022-07-11 RX ADMIN — LEVETIRACETAM SCH MLS/HR: 10 INJECTION INTRAVENOUS at 09:25

## 2022-07-11 RX ADMIN — OXYCODONE HYDROCHLORIDE AND ACETAMINOPHEN SCH: 10; 325 TABLET ORAL at 18:04

## 2022-07-11 NOTE — P.PN
Subjective





Patient was examined at bedside he is awake alert oriented 2.  Patient was very

combative and possible seizure-like activity Ativan was administered after 

discussing the case with neuro and the patient apparently responded really well 

during my examination.  I only came into the examination room after Ativan was 

administered by the RN.  Patient was able to tell me his full name and his date 

of birth.  She was slightly pleasant compared to his baseline as per RN.  

Transfer has been initiated case discussed with case management and RN present 

at bedside.





Objective





- Vital Signs


Vital signs: 


                                   Vital Signs











Temp  97.6 F   07/11/22 04:00


 


Pulse  71   07/11/22 04:00


 


Resp  18   07/11/22 04:00


 


BP  174/84   07/11/22 04:00


 


Pulse Ox  90 L  07/11/22 04:00


 


FiO2      








                                 Intake & Output











 07/10/22 07/11/22 07/11/22





 18:59 06:59 18:59


 


Intake Total 100 0 


 


Balance 100 0 


 


Intake:   


 


  Intake, IV Titration 100  





  Amount   


 


    levETIRAcetam IV 1,500 mg 100  





    In Saline 1 100ml.bag @   





    400 mls/hr IVPB ONCE STA   





    Rx#:[id number]   


 


  Oral 0 0 


 


Other:   


 


  Voiding Method  Incontinent 


 


  # Voids 3 4 














- Exam





Physical exam


General: Patient is confused today however anoderm 2 during my examination


Head: atraumatic, normocephalic, symmetric


Eyes: no lid lesion], anicteric sclera


Mouth: no lip lesion, mucus membranes moist


Cardiovascular: S1S2 reg rate and rhythm, no murmur, no gallop


Lungs: Bilateral equal air entry, no wheezing no rhonchi no crackles.


Abdominal: soft,  nontender to palpation, no guarding, no appreciable 

organomegaly


Ext: no gross muscle atrophy, no edema extremities warm to suppose a positive


Neuro: Alert oriented  x 1-2 , nonfocal exam





- Labs


CBC & Chem 7: 


                                 07/10/22 07:42





                                 07/10/22 07:42


Labs: 


                  Abnormal Lab Results - Last 24 Hours (Table)











  07/10/22 07/10/22 07/11/22 Range/Units





  07:42 21:22 03:37 


 


Plt Count  44 L    (150-450)  k/uL


 


Blast Cells %  3 H*    %


 


Neutrophils # (Manual)  1.00 L    (1.3-7.7)  k/uL


 


Lymphocytes # (Manual)  0.94 L    (1.0-4.8)  k/uL


 


Myelocytes # (Manual)  0.02 H    (0)  k/uL


 


Blast Cells # (Man)  0.07 H    (0)  k/uL


 


ABG pH   7.48 H   (7.35-7.45)  


 


ABG pO2   79 L   ()  mmHg


 


ABG HCO3   27 H   (21-25)  mmol/L


 


ABG Total CO2   28 H   (19-24)  mmol/L


 


Urine Blood    Small H  (Negative)  


 


Urine RBC    7 H  (0-5)  /hpf








                      Microbiology - Last 24 Hours (Table)











 07/08/22 16:47 Blood Culture - Preliminary





 Blood    No Growth after 48 hours


 


 07/08/22 17:01 Blood Culture - Preliminary





 Blood    No Growth after 48 hours














Assessment and Plan


Assessment: 








Syncopal episode with Encephalopathy versus non-epileptic convulsions ?


CT negative, infectious workup negative, Abnormal EEG, seizures cannot be 

excluded, anemia workup as mentioned below


Patient started on Keppra by neurology


MRI couldnt be done per neurology as pt had IVC filter 


Will DC baclofen, pregabalin prednisone as these can affect mental status 


Echocardiogram negative, plan for her 30 day event monitor with a cardiology 

follow-up on discharge


Appreciated recommendations from Neurology and cardiology


Ativan received 1 today responded really well per RN





Macrocytic Anemia and thrombocytopenia, abnormal per flow smear positive for 

blast cells along with other immature blood cells


Checking B12 folate patient was started


Patient has blasts on exam, Oncology consulted planning for bone marrow biopsy 

possibly for Monday/ Tuesday


General surgery consulted, Inital plan was for upper and lower endoscopy on 

Monday, Currently on hold as Hb stable and no signs of active bleeding 





Questionable pulmonary embolism on CTA


further recommendations regarding anticoagulation from oncology pending





COPD, currently not in exacerbation 


Continue home inhalers


Initally was started on oral prednisone , will dc 





Abnormal ultrasound liver


Liver ultrasound showing cholelithiasis, mild gallbladder wall thickening 

consistent with possible cholecystitis


HIDA negative 





Chronic nicotine dependence


Nicotine patch as needed





DVT prophylaxis: Eliquis


CODE STATUS: Full code


Discharge plan: Transfer orders have been initiated pending placement as per 

case management.  Any further recommendations from neurology as well.

## 2022-07-11 NOTE — P.PN
Subjective


Progress Note Date: 07/11/22





Patient initially seen by Dr. John Cavanaugh.  Please refer to his note for details.

 Patient is a 62-year-old male having syncopal episode and clinically seizure-

like activity.  The initial EEG read by Dr. Cavanaugh, who noticed there was rare 

epileptiform discharges frontotemporal central.  He was started on Keppra 1 g 

twice a day.  Repeat EEG was performed today.  Patient also has possible 

myelodysplastic syndrome, newly diagnosed and getting workup.





Patient at present complaining of headache, which he admitted that is severe.  

Per nursing report, patient has been thrashing, yelling, screaming, difficult to

manage.  He is abusing words.  He is trying to climb out of bed, fall risk.  

Please refer to examination below.  Nursing staff has not noticed any seizure-

like activity, only aggression, agitation.





Objective





- Vital Signs


Vital signs: 


                                   Vital Signs











Temp  97.6 F   07/11/22 04:00


 


Pulse  71   07/11/22 04:00


 


Resp  18   07/11/22 04:00


 


BP  174/84   07/11/22 04:00


 


Pulse Ox  90 L  07/11/22 04:00


 


FiO2      








                                 Intake & Output











 07/10/22 07/11/22 07/11/22





 18:59 06:59 18:59


 


Intake Total 100 0 


 


Balance 100 0 


 


Intake:   


 


  Intake, IV Titration 100  





  Amount   


 


    levETIRAcetam IV 1,500 mg 100  





    In Saline 1 100ml.bag @   





    400 mls/hr IVPB ONCE STA   





    Rx#:[id number]   


 


  Oral 0 0 


 


Other:   


 


  Voiding Method  Incontinent 


 


  # Voids 3 4 














- Exam





On examination patient is a late middle aged  male, who appears older 

than his stated age.  He is in cephalopathic, agitated, restless, trying to roll

out of the bed.  He then started using foul words, constantly repeating " F--- 

Y--". 





Patient knows his name Rodrigue, states is 14 years old.  When asked the month, is

12, and the year is 12.  Patient's pupils are equal, round and reacting, face 

appears symmetric.  Visual fields could not be tested.  Patient's , biceps 

and triceps are normal.  He did not cooperate for testing of the lower 

extremities although he moves them equally bilaterally.  Reflexes are symmetric.

 Plantars are downgoing.  Tone and bulk of muscles normal.





Patient has ecchymosis on his skin.  Some peripheral edema.  Abdomen appears 

soft and nontender.  Patient is congested, coughing.





- Labs


CBC & Chem 7: 


                                 07/10/22 07:42





                                 07/10/22 07:42


Labs: 


                  Abnormal Lab Results - Last 24 Hours (Table)











  07/10/22 07/10/22 07/11/22 Range/Units





  07:42 21:22 03:37 


 


Plt Count  44 L    (150-450)  k/uL


 


Blast Cells %  3 H*    %


 


Neutrophils # (Manual)  1.00 L    (1.3-7.7)  k/uL


 


Lymphocytes # (Manual)  0.94 L    (1.0-4.8)  k/uL


 


Myelocytes # (Manual)  0.02 H    (0)  k/uL


 


Blast Cells # (Man)  0.07 H    (0)  k/uL


 


ABG pH   7.48 H   (7.35-7.45)  


 


ABG pO2   79 L   ()  mmHg


 


ABG HCO3   27 H   (21-25)  mmol/L


 


ABG Total CO2   28 H   (19-24)  mmol/L


 


Urine Blood    Small H  (Negative)  


 


Urine RBC    7 H  (0-5)  /hpf








                      Microbiology - Last 24 Hours (Table)











 07/08/22 16:47 Blood Culture - Preliminary





 Blood    No Growth after 48 hours


 


 07/08/22 17:01 Blood Culture - Preliminary





 Blood    No Growth after 48 hours














Assessment and Plan


Assessment: 





Recurrent Syncopal-like episodes for past 5 days prior to admission, seems like 

seizure (has loss of consciousness, urinary incontinence, tongue bite and some 

episodes shaking of uppers that are uncontrolled then proceeded with LOC.  Had 

episodes while walking or sitting down). EEG revealed questionable rare fronto-

temporal central epileptiform discharges.  No seizures (as per Dr. Cavanaugh 

report).


Encephalopathy due to multifactorial: likely ?seizures versus syncope, metabolic

encephlopathy and leukemia/myelodysplastic syndrome. 





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


Severe neutropenia, anemia with hemoglobin 8.4, thrombocytopenia, hypokalemia, 

elevated liver enzymes


Vitamin B12 deficiency, borderline folate.





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  


Chronic neck pain


Chronic lower back pain


Chronic anemia


Hypertension


History of DVT on eliquis


COPD


History of hepatitis 


Nicotine use




















Plan: 





Dr. Cavanaugh had loaded the patient with Keppra 1500 mg once on Saturday, 

07/09/2022, then started the patient on Keppra 500 every 12 hours.  He further 

increased the dose of the Keppra to 1000 mg every 12 hours since yesterday 

because of episode of confusion.  Also loaded with Vimpat 200 mg IV 1 dose.   

Patient was not taking any antiepileptic medication prior to this.  With his 

severe behavioral problem, we will cut back on Keppra to 750 mg every 12 hours.





Repeat EEG performed today showed worsening of background, consistent with toxic

metabolic encephalopathy.  No definitive epileptiform activity was seen.





Patient is admitting of having headache, and with his altered mental status, we 

will initiate lumbar puncture to rule out intracranial infection.  Patient has 

low platelets, therefore uncertain if it can be accomplished.  Hematology on 

board.





Repeat CT head with contrast from yesterday 07/10/2022 showed no acute process. 

I personally reviewed CT head, agree with the findings.  Cannot obtain MRI of 

the brain since the patient has IVC filter.  Recommend MRI of the brain with and

without as an outpatient that is a IVC filter compatible.


   


Oncology team is on board is seems the patient is scheduled for bone marrow 

biopsy this Tuesday


Patient is on fall precautions


On seizure precautions seizure pads.


Cardiology is consulted.


Patient was counseled on tobacco cessation.


Will defer the rest of medical management to the primary team.


Upon discharge, patient needs to follow-up with neurologist as outpatient within

1-2 weeks..

## 2022-07-11 NOTE — P.PN
Subjective


Progress Note Date: 07/11/22


Principal diagnosis: 





Abnormal CBC





Bone marrow scheduled for Wednesday at 12:30





Objective





- Vital Signs


Vital signs: 


                                   Vital Signs











Temp  97.7 F   07/11/22 12:19


 


Pulse  72   07/11/22 12:19


 


Resp  17   07/11/22 12:19


 


BP  176/85   07/11/22 12:19


 


Pulse Ox  95   07/11/22 12:19


 


FiO2      








                                 Intake & Output











 07/10/22 07/11/22 07/11/22





 18:59 06:59 18:59


 


Intake Total 100 0 


 


Balance 100 0 


 


Intake:   


 


  Intake, IV Titration 100  





  Amount   


 


    levETIRAcetam IV 1,500 mg 100  





    In Saline 1 100ml.bag @   





    400 mls/hr IVPB ONCE STA   





    Rx#:[id number]   


 


  Oral 0 0 


 


Other:   


 


  Voiding Method  Incontinent 


 


  # Voids 3 4 














- Exam





- Constitutional


General appearance: Present: cooperative, no acute distress





- EENT


Eyes: Present: EOMI


ENT: Present: NA/AT





- Neck


Neck: Present: normal ROM





- Respiratory


Respiratory: bilateral: CTA





- Cardiovascular


Rhythm: regularly irregular





- Gastrointestinal


General gastrointestinal: Present: distended, soft





- Integumentary


Integumentary: Present: pale





- Neurologic


Neurologic: Present: CNII-XII intact





- Musculoskeletal


Musculoskeletal: Present: generalized weakness





- Psychiatric


Psychiatric: Present: A&O x's 3








- Labs


CBC & Chem 7: 


                                 07/10/22 07:42





                                 07/10/22 07:42


Labs: 


                  Abnormal Lab Results - Last 24 Hours (Table)











  07/08/22 07/08/22 07/08/22 Range/Units





  02:13 16:47 17:01 


 


ABG pH     (7.35-7.45)  


 


ABG pO2     ()  mmHg


 


ABG HCO3     (21-25)  mmol/L


 


ABG Total CO2     (19-24)  mmol/L


 


Albumin (PEP)   3.13 L   (3.80-4.90)  g/dL


 


Gamma Globulins   0.58 L   (0.70-1.50)  g/dL


 


RBC Folate  1,187 H    (280 - 791)  ng/mL


 


Urine Blood     (Negative)  


 


Urine RBC     (0-5)  /hpf


 


HCV RNA Qual (PCR)    DETECTED A  (Not detected)  














  07/10/22 07/11/22 Range/Units





  21:22 03:37 


 


ABG pH  7.48 H   (7.35-7.45)  


 


ABG pO2  79 L   ()  mmHg


 


ABG HCO3  27 H   (21-25)  mmol/L


 


ABG Total CO2  28 H   (19-24)  mmol/L


 


Albumin (PEP)    (3.80-4.90)  g/dL


 


Gamma Globulins    (0.70-1.50)  g/dL


 


RBC Folate    (280 - 791)  ng/mL


 


Urine Blood   Small H  (Negative)  


 


Urine RBC   7 H  (0-5)  /hpf


 


HCV RNA Qual (PCR)    (Not detected)  








                      Microbiology - Last 24 Hours (Table)











 07/08/22 16:47 Blood Culture - Preliminary





 Blood    No Growth after 48 hours


 


 07/08/22 17:01 Blood Culture - Preliminary





 Blood    No Growth after 48 hours














Assessment and Plan


(1) Abnormal complete blood count


Narrative/Plan: 


COncern for primary acute bone marrow discorder


 - Bone Marrow Biopsy scheduled for Tuesday


LDH Elevated, B12 and FOlate suboptimal and supplement ordered





Neutropenia, no growth factor with concern of primary bone marrow disorder





Bone marrow biopsy confirmed for Wednesday at 12:30





Daily CBC and will transfuse Hg <7 Irradiated and Platelets less 10





NO ASA< NSAIS OR AC


Current Visit: Yes   Status: Acute   Code(s): R79.89 - OTHER SPECIFIED ABNORMAL 

FINDINGS OF BLOOD CHEMISTRY   SNOMED Code(s): 742309013


   





(2) Macrocytic anemia


Narrative/Plan: 


macrocytosis


Current Visit: Yes   Status: Acute   Code(s): D53.9 - NUTRITIONAL ANEMIA, 

UNSPECIFIED   SNOMED Code(s): 32613261


   





(3) Thrombocytopenia


Narrative/Plan: 


60K if drops below 50K, NO ASA, NSAIS or AC therapy


Monitor closely for bleeding


Current Visit: Yes   Status: Acute   Code(s): D69.6 - THROMBOCYTOPENIA, 

UNSPECIFIED   SNOMED Code(s): 973701455


   





(4) Hypokalemia


Current Visit: Yes   Status: Acute   Code(s): E87.6 - HYPOKALEMIA   SNOMED 

Code(s): 36362761


   


Plan: 





BM biopsy Wednesday 


Orders in


Monitor daily labs and for bleeding or infection in interim

## 2022-07-11 NOTE — EEG
ELECTROENCEPHALOGRAM REPORT



DATE OF SERVICE:

07/11/2022



PREAMBLE:

This is a 62-year-old male who has worsening confusion.  Some concern about seizure.

This is a followup EEG.  Patient is currently on Keppra.



EEG FINDINGS:

A 21 channel digital EEG recorded with video component, utilizing 10/20 international

system with referential and bipolar montages.  Background consists of well-developed,

poorly regulated, mixed frequencies of 5-6 hertz theta intermixed with moderate to high

amplitude 2-3 hertz delta activity seen in bihemispheric region.  Background does not

seem to be reactive to any eye opening or closing.  Occasional frontal intermittent

rhythmic delta activity was seen.  Different stages of sleep were not seen.  No focal

or generalized epileptiform activity was seen.



IMPRESSION:

This is an abnormal EEG due to background slowing of moderate to severe degree.  This

is suggestive of generalized cerebral dysfunction as can be seen with toxic metabolic

encephalopathy or related to diffuse structural brain abnormality.  No focal or

generalized epileptiform activity was seen.  When compared to the EEG from 07/08/2022,

the background has become more slow.  Clinical correlation recommended.





MMODL / IJN: [id number] / Job#: [id number]

## 2022-07-11 NOTE — P.PAINPG
Objective





- Vital Signs


Vital signs: 


                                   Vital Signs











Temp  97.7 F   07/11/22 12:19


 


Pulse  72   07/11/22 15:45


 


Resp  17   07/11/22 15:45


 


BP  176/85   07/11/22 12:19


 


Pulse Ox  95   07/11/22 12:19


 


FiO2      








                                 Intake & Output











 07/10/22 07/11/22 07/11/22





 18:59 06:59 18:59


 


Intake Total 100 0 


 


Balance 100 0 


 


Intake:   


 


  Intake, IV Titration 100  





  Amount   


 


    levETIRAcetam IV 1,500 mg 100  





    In Saline 1 100ml.bag @   





    400 mls/hr IVPB ONCE STA   





    Rx#:[id number]   


 


  Oral 0 0 


 


Other:   


 


  Voiding Method  Incontinent Incontinent


 


  # Voids 3 4 1














- Labs


CBC & Chem 7: 


                                 07/10/22 07:42





                                 07/10/22 07:42


Labs: 


                  Abnormal Lab Results - Last 24 Hours (Table)











  07/08/22 07/08/22 07/08/22 Range/Units





  02:13 16:47 17:01 


 


ABG pH     (7.35-7.45)  


 


ABG pO2     ()  mmHg


 


ABG HCO3     (21-25)  mmol/L


 


ABG Total CO2     (19-24)  mmol/L


 


Albumin (PEP)   3.13 L   (3.80-4.90)  g/dL


 


Gamma Globulins   0.58 L   (0.70-1.50)  g/dL


 


RBC Folate  1,187 H    (280 - 791)  ng/mL


 


Urine Blood     (Negative)  


 


Urine RBC     (0-5)  /hpf


 


HCV RNA Qual (PCR)    DETECTED A  (Not detected)  














  07/10/22 07/11/22 Range/Units





  21:22 03:37 


 


ABG pH  7.48 H   (7.35-7.45)  


 


ABG pO2  79 L   ()  mmHg


 


ABG HCO3  27 H   (21-25)  mmol/L


 


ABG Total CO2  28 H   (19-24)  mmol/L


 


Albumin (PEP)    (3.80-4.90)  g/dL


 


Gamma Globulins    (0.70-1.50)  g/dL


 


RBC Folate    (280 - 791)  ng/mL


 


Urine Blood   Small H  (Negative)  


 


Urine RBC   7 H  (0-5)  /hpf


 


HCV RNA Qual (PCR)    (Not detected)  








                      Microbiology - Last 24 Hours (Table)











 07/08/22 16:47 Blood Culture - Preliminary





 Blood    No Growth after 48 hours


 


 07/08/22 17:01 Blood Culture - Preliminary





 Blood    No Growth after 48 hours














PQRS Measure Charge Sheet


Comment: 





HISTORY OF PRESENT ILLNESS:


62 yr old inpatient male as a referral from Dr Cavanaugh with a history of HTN 

admitted for CHI s/p fall with pending bone marrow biopsy presents today with 

mental status changes s/p fall with head injury. CT Head is negative. EEG 

reviewed and pt placed on anti epileptic medications. Pt fidgety & restless 

while laying supine in bed in room. Aide at side. Pt repeats vulgarities, though

pauses when questions are asked, so unsure if he may have receptive deficits. 

Does not answer to questions whether he has pain, where he has pain or if he 

wants an ice bag. Will place on OR schedule opening for tomorrow for LP to rule 

out infectious process as the underlying cause of mental status changes.





PMH: Blood Disorder, HTN


PSH: Back Surgery, Orthopedic Surgery


SH: Daily tobacco use, No ETOH abuse, No illicit drug use.


FH: Mother- HTN


All: See list


Meds: See list





REVIEW OF ORGAN SYSTEMS:


                     CONSTITUTIONAL:  No fevers or chills. No recent weight 

loss.


                     NEUROLOGICAL: +  numbness and tingling along the distal 

extremities. No seizure disorders or headaches.


                     MUSCULOSKELETAL: + pain 


                     PSYCHIATRIC:  Denies current depression or suicidal 

thoughts.


    


Physical Examinations  :


                Constitutional : Uncooperative, vulgar language, mostly 

repeating "f--- you"                                                            

                                                                                

                                                                                

                                                                                

                                  


                Neurologic :   Cranial nerve II   to  XII  intact. No focal 

neurological deficits.


                Psychiatric : alert & oriented  x 1 (Does not know where he is, 

believes year is 2020) Nonmatching mood & inappropriate affect. Poor Judgment & 

no insight.


                Musculoskeletal :     


                               Cervical Spine 


                                         Motor strength in the deltoid and 

biceps: Normal  right side. Normal  Left side


                                         Motor strength biceps and the wrist 

extensors:   Normal right side . Normal left side 


                                         Motor strength in the triceps muscle: 

Normal right side.  Normal  left side  


                                         Deep tendon reflexes:  Normal at the 

biceps. Normal at Brachioradialis. Normal at triceps


                                         Vertebral body tenderness to deep 

palpation over 


                                         Cervical facet loading test: positive 

bilaterally


                                         Spurling test: positive bilaterally


                                         Neck distraction test: positive 

bilaterally


                                         Anshul sign: positive bilaterally


                                  Lumbar spine


                                         Motor strength lower extremities ,thigh

and legs  5/5 Right side ,  5/5  Left side 


                                         Deep tendon reflexes :   Normal  Knee J

erk. Normal   Ankle Jerk  


                                         Vertebral body tenderness over 


                                         Lumbar facet Loading Test: positive 

Right  / positive Left  


                                         Range of motion of the lumbar spine  

Flexion  30 degrees,   extension   10 degrees


                                         Straight Leg Raise test: Left/ Right 

positive at   degree   


                                         Baldomero test: positive right /  positive 

left.


                                         Severe tenderness over the Sacroiliac 

joint  on the Right / Left  sides   


                                         Gaenslen  test: positive bilaterally


                                         Seated flexion test: positive 

bilaterally.


                                 Sacral spine :


                                         Severe tenderness over the Sacroiliac 

joint:  right side / left side 


                                         Range of motion: Flexion of the lumbar 

spine <60 degrees


                                         Range of motion: Extension of the 

lumbar spine <20 degrees


                                         Gaenslen's Test positive 


                                         Luis Armando's Test positive 


                                         Baldomero test: positive  right side /  

left side


                                         Thigh Thrust Test


                                         Sacral Thrust Test


Imaging:


CT Scan of Head reviewed.





Assessment/ Plan : 


Mental Status changes s/p fall with CHI, rule out intracranial infectious 

process with LP


As pt has not been transferred out, and if pt still at [facility], 

recommendation of LP. Need consent from family prior to procedure. Nurse Venita 

stated she has had a hard time reaching family today. Stop all anticoagulants 

now. Pt to be NPO at midnight tonight and tomorrow morning until LP is complete.

Protocol for discontinuation/ continuation of medications david procedure 

discussed with pt's nurse on shift.


All questions answered.


I have spent greater than 30 minutes on patient care today. Dr Rizvi was 

available by phone for the evaluation of this patient. The time was used to 

review the medical records including relevant urine studies and Prescription 

history (MAPs), review of the available imaging, evaluation and examination of 

the patient, coordination of care with the medical staff and if applicable 

referring physicians, as well as creation of the medical record





- Pain Location


  ** Generalized


Non-Pharmacological Interventions: Darkened Room


Pharmacological Interventions: Discuss Pain Med Options


PQRS Narrative: 


                                        





Do You Want the Pneumonia        Vaccine Up to Date


Vaccine AT THIS TIME?            


Blood Pressure [Right Arm        176/85


Sitting]                         


Blood Pressure [Left Arm         130/59


Standing]                        


Blood Pressure [Left Arm         98/64


Sitting]                         


Blood Pressure [Left Arm         116/69


Supine]                          


Blood Pressure                   139/72


Pain Intensity [Generalized]     0


Pain Intensity                   10


Pain Scale Used                  Numeric (1 - 10)


Scale Used                       Numeric (1 - 10)








Home Medications: 


Ambulatory Orders





ALPRAZolam [Xanax] 1 mg PO TID 07/07/22 


Albuterol Inhaler [Ventolin Hfa Inhaler] 2 puff INHALATION RT-QID PRN 07/07/22 


Apixaban [Eliquis] 5 mg PO BID 07/07/22 


Baclofen 10 mg PO BID 07/07/22 


Fluticasone/Umeclidin/Vilanter [Trelegy Ellipta 100-62.5-25] 1 puff INHALATION 

RT-DAILY 07/07/22 


Furosemide [Lasix] 20 mg PO HS 07/07/22 


Furosemide [Lasix] 40 mg PO DAILY 07/07/22 


Mirtazapine 30 mg PO HS 07/07/22 


Montelukast [Singulair] 10 mg PO HS 07/07/22 


Mupirocin 2% Oint [Bactroban 2% Oint] 1 applic TOPICAL DAILY 07/07/22 


Naloxone HCl [Narcan] 4 mg NASAL ONCE PRN 07/07/22 


Pantoprazole [Protonix] 40 mg PO DAILY 07/07/22 


Potassium Chloride ER [K-Dur 20] 20 meq PO DAILY 07/07/22 


Pregabalin [Lyrica] 150 mg PO BID 07/07/22 


Promethazine HCl [Phenergan Syrup] 6.25 mg PO Q6H PRN 07/07/22 


hydroCHLOROthiazide [Hydrodiuril] 25 mg PO DAILY 07/07/22 


lisinopriL [Prinivil] 20 mg PO DAILY 07/07/22 


oxyCODONE-APAP 10-325MG [Percocet  mg] 1 tab PO TID 07/07/22 











Controlled Substance Measures





- Controlled Substance Measures


Is patient prescribed a controlled substance at discharge?: No

## 2022-07-11 NOTE — P.PN
Subjective


Progress Note Date: 07/11/22





CHIEF COMPLAINT: Syncope





HISTORY OF PRESENT ILLNESS: Surgical service is following and concerns for patie

nt's anemia and possible GI bleed.  No further black stools reported by nursing 

staff.  Patient is having episodes of aggression and confusion.  Neurology is 

following concerns that this may be seizure related.  They are also working on 

transferring patient to a tertiary care center for a continuous EEG.  Scopes are

currently on hold due to patient requiring bone marrow biopsy per 

recommendations by oncology service.  Afebrile.  WBC is 2.4 Hgb 8.4 platelets 

are 44 blast cells 3





PHYSICAL EXAM: 


VITAL SIGNS: Reviewed.


GENERAL: Well-developed in no acute distress. 


ABDOMEN:  Soft.  Nondistended. Nontender. 


NEUROLOGIC: Confused





ASSESSMENT: 


1.  Anemia


2.  Syncope


3.  Possible seizures








PLAN: 


-We'll hold off on upper and lower endoscopy until oncology request that it is 

performed.  Awaiting bone marrow biopsy on Tuesday per oncology


-Continue seizure workup per neurology.  They're working on transferring patient

to tertiary care center for continuous EEG


-Continue supportive care





Physician Assistant note has been reviewed by physician. Signing provider agrees

with the documented findings, assessment, and plan of care. 





Objective





- Vital Signs


Vital signs: 


                                   Vital Signs











Temp  97.6 F   07/11/22 04:00


 


Pulse  71   07/11/22 04:00


 


Resp  18   07/11/22 04:00


 


BP  174/84   07/11/22 04:00


 


Pulse Ox  90 L  07/11/22 04:00


 


FiO2      








                                 Intake & Output











 07/10/22 07/11/22 07/11/22





 18:59 06:59 18:59


 


Intake Total 100 0 


 


Balance 100 0 


 


Intake:   


 


  Intake, IV Titration 100  





  Amount   


 


    levETIRAcetam IV 1,500 mg 100  





    In Saline 1 100ml.bag @   





    400 mls/hr IVPB ONCE STA   





    Rx#:[id number]   


 


  Oral 0 0 


 


Other:   


 


  Voiding Method  Incontinent 


 


  # Voids 3 4 














- Labs


CBC & Chem 7: 


                                 07/10/22 07:42





                                 07/10/22 07:42


Labs: 


                  Abnormal Lab Results - Last 24 Hours (Table)











  07/10/22 07/10/22 07/11/22 Range/Units





  07:42 21:22 03:37 


 


Plt Count  44 L    (150-450)  k/uL


 


Blast Cells %  3 H*    %


 


Neutrophils # (Manual)  1.00 L    (1.3-7.7)  k/uL


 


Lymphocytes # (Manual)  0.94 L    (1.0-4.8)  k/uL


 


Myelocytes # (Manual)  0.02 H    (0)  k/uL


 


Blast Cells # (Man)  0.07 H    (0)  k/uL


 


ABG pH   7.48 H   (7.35-7.45)  


 


ABG pO2   79 L   ()  mmHg


 


ABG HCO3   27 H   (21-25)  mmol/L


 


ABG Total CO2   28 H   (19-24)  mmol/L


 


Urine Blood    Small H  (Negative)  


 


Urine RBC    7 H  (0-5)  /hpf








                      Microbiology - Last 24 Hours (Table)











 07/08/22 16:47 Blood Culture - Preliminary





 Blood    No Growth after 48 hours


 


 07/08/22 17:01 Blood Culture - Preliminary





 Blood    No Growth after 48 hours

## 2022-07-12 LAB
ALBUMIN SERPL-MCNC: 3.3 G/DL (ref 3.5–5)
ALP SERPL-CCNC: 91 U/L (ref 38–126)
ALT SERPL-CCNC: 109 U/L (ref 4–49)
ANION GAP SERPL CALC-SCNC: 5 MMOL/L
AST SERPL-CCNC: 83 U/L (ref 17–59)
BLASTS # BLD MANUAL: 0.21 K/UL
BUN SERPL-SCNC: 14 MG/DL (ref 9–20)
CALCIUM SPEC-MCNC: 8 MG/DL (ref 8.4–10.2)
CELLS COUNTED: 200
CHLORIDE SERPL-SCNC: 114 MMOL/L (ref 98–107)
CO2 SERPL-SCNC: 25 MMOL/L (ref 22–30)
EOSINOPHIL # BLD MANUAL: 0.03 K/UL (ref 0–0.7)
ERYTHROCYTE [DISTWIDTH] IN BLOOD BY AUTOMATED COUNT: 2.41 M/UL (ref 4.3–5.9)
ERYTHROCYTE [DISTWIDTH] IN BLOOD: 16.8 % (ref 11.5–15.5)
GLUCOSE BLD-MCNC: 117 MG/DL (ref 70–110)
GLUCOSE SERPL-MCNC: 88 MG/DL (ref 74–99)
HCT VFR BLD AUTO: 28.2 % (ref 39–53)
HGB BLD-MCNC: 9 GM/DL (ref 13–17.5)
LYMPHOCYTES # BLD MANUAL: 0.99 K/UL (ref 1–4.8)
MAGNESIUM SPEC-SCNC: 2 MG/DL (ref 1.6–2.3)
MCH RBC QN AUTO: 37.2 PG (ref 25–35)
MCHC RBC AUTO-ENTMCNC: 31.8 G/DL (ref 31–37)
MCV RBC AUTO: 117.1 FL (ref 80–100)
MONOCYTES # BLD MANUAL: 0.42 K/UL (ref 0–1)
NEUTROPHILS NFR BLD MANUAL: 46 %
NEUTS SEG # BLD MANUAL: 1.38 K/UL (ref 1.3–7.7)
PLATELET # BLD AUTO: 59 K/UL (ref 150–450)
POTASSIUM SERPL-SCNC: 3.5 MMOL/L (ref 3.5–5.1)
PROT SERPL-MCNC: 5.5 G/DL (ref 6.3–8.2)
SODIUM SERPL-SCNC: 144 MMOL/L (ref 137–145)
WBC # BLD AUTO: 3 K/UL (ref 3.8–10.6)

## 2022-07-12 RX ADMIN — IPRATROPIUM BROMIDE AND ALBUTEROL SULFATE SCH ML: .5; 3 SOLUTION RESPIRATORY (INHALATION) at 12:03

## 2022-07-12 RX ADMIN — OXYCODONE HYDROCHLORIDE AND ACETAMINOPHEN SCH: 10; 325 TABLET ORAL at 11:45

## 2022-07-12 RX ADMIN — FOLIC ACID SCH: 1 TABLET ORAL at 10:00

## 2022-07-12 RX ADMIN — CYANOCOBALAMIN SCH MCG: 1000 INJECTION, SOLUTION INTRAMUSCULAR; SUBCUTANEOUS at 19:03

## 2022-07-12 RX ADMIN — OXYCODONE HYDROCHLORIDE AND ACETAMINOPHEN SCH EACH: 10; 325 TABLET ORAL at 20:49

## 2022-07-12 RX ADMIN — LORAZEPAM PRN MG: 2 INJECTION INTRAMUSCULAR; INTRAVENOUS at 02:54

## 2022-07-12 RX ADMIN — MONTELUKAST SODIUM SCH MG: 10 TABLET, FILM COATED ORAL at 20:49

## 2022-07-12 RX ADMIN — OXYCODONE HYDROCHLORIDE AND ACETAMINOPHEN SCH EACH: 10; 325 TABLET ORAL at 15:10

## 2022-07-12 RX ADMIN — POTASSIUM CHLORIDE SCH: 20 TABLET, EXTENDED RELEASE ORAL at 10:00

## 2022-07-12 RX ADMIN — BUDESONIDE AND FORMOTEROL FUMARATE DIHYDRATE SCH PUFF: 80; 4.5 AEROSOL RESPIRATORY (INHALATION) at 19:35

## 2022-07-12 RX ADMIN — IPRATROPIUM BROMIDE AND ALBUTEROL SULFATE SCH ML: .5; 3 SOLUTION RESPIRATORY (INHALATION) at 19:35

## 2022-07-12 RX ADMIN — LEVETIRACETAM SCH MLS/HR: 100 INJECTION, SOLUTION, CONCENTRATE INTRAVENOUS at 11:51

## 2022-07-12 RX ADMIN — PANTOPRAZOLE SODIUM SCH MG: 40 INJECTION, POWDER, FOR SOLUTION INTRAVENOUS at 20:49

## 2022-07-12 RX ADMIN — BUDESONIDE AND FORMOTEROL FUMARATE DIHYDRATE SCH: 80; 4.5 AEROSOL RESPIRATORY (INHALATION) at 07:57

## 2022-07-12 RX ADMIN — IPRATROPIUM BROMIDE AND ALBUTEROL SULFATE SCH: .5; 3 SOLUTION RESPIRATORY (INHALATION) at 07:57

## 2022-07-12 RX ADMIN — IPRATROPIUM BROMIDE AND ALBUTEROL SULFATE SCH ML: .5; 3 SOLUTION RESPIRATORY (INHALATION) at 15:51

## 2022-07-12 RX ADMIN — LEVETIRACETAM SCH MLS/HR: 100 INJECTION, SOLUTION, CONCENTRATE INTRAVENOUS at 20:49

## 2022-07-12 RX ADMIN — HALOPERIDOL LACTATE PRN MG: 5 INJECTION, SOLUTION INTRAMUSCULAR at 02:05

## 2022-07-12 RX ADMIN — PANTOPRAZOLE SODIUM SCH: 40 INJECTION, POWDER, FOR SOLUTION INTRAVENOUS at 10:00

## 2022-07-12 NOTE — P.PN
Subjective


Progress Note Date: 07/12/22





CHIEF COMPLAINT: Syncope





HISTORY OF PRESENT ILLNESS: Surgical service is following and concerns for patie

nt's anemia and possible GI bleed.  No bowel movements reported.  Patient is 

still having episodes of aggression and confusion.  Neurology is following 

concerns that this may be seizure related.  They are also working on 

transferring patient to a tertiary care center for a continuous EEG.  Patient is

currently in 4 point restraints.  Scopes are currently on hold due to patient 

requiring bone marrow biopsy per recommendations by oncology service.  Patient 

scheduled for bone marrow biopsy possibly Wednesday.  Neurology has recommended 

a lumbar puncture.  Afebrile.  WBC is 3.0 Hgb is up 8.4 to 9.0  platelets are 59

blast cells 7





PHYSICAL EXAM: 


VITAL SIGNS: Reviewed.


GENERAL: Well-developed in no acute distress. 


ABDOMEN:  Soft.  Nondistended. Nontender. 


NEUROLOGIC: Confused





ASSESSMENT: 


1.  Anemia


2.  Syncope


3.  Possible seizures








PLAN: 


-We'll hold off on upper and lower endoscopy until oncology request that it is 

performed.  Awaiting bone marrow biopsy possibly Wednesday


-Continue seizure workup per neurology.  They're working on transferring patient

to tertiary care center for continuous EEG


-Continue supportive care





Physician Assistant note has been reviewed by physician. Signing provider agrees

with the documented findings, assessment, and plan of care. 





Objective





- Vital Signs


Vital signs: 


                                   Vital Signs











Temp  97.6 F   07/12/22 04:00


 


Pulse  80   07/12/22 12:03


 


Resp  20   07/12/22 04:00


 


BP  165/72   07/12/22 04:00


 


Pulse Ox  100   07/12/22 04:00


 


FiO2      








                                 Intake & Output











 07/11/22 07/12/22 07/12/22





 18:59 06:59 18:59


 


Intake Total  120 


 


Balance  120 


 


Weight   121.563 kg


 


Intake:   


 


  Oral  120 


 


Other:   


 


  Voiding Method Incontinent Incontinent 


 


  # Voids 1 0 














- Labs


CBC & Chem 7: 


                                 07/12/22 07:20





                                 07/12/22 07:20


Labs: 


                  Abnormal Lab Results - Last 24 Hours (Table)











  07/08/22 07/08/22 07/08/22 Range/Units





  02:13 17:01 17:01 


 


WBC     (3.8-10.6)  k/uL


 


RBC     (4.30-5.90)  m/uL


 


Hgb     (13.0-17.5)  gm/dL


 


Hct     (39.0-53.0)  %


 


MCV     (80.0-100.0)  fL


 


MCH     (25.0-35.0)  pg


 


RDW     (11.5-15.5)  %


 


Plt Count     (150-450)  k/uL


 


Blast Cells %     %


 


Neutrophils # (Manual)     (1.3-7.7)  k/uL


 


Lymphocytes # (Manual)     (1.0-4.8)  k/uL


 


Blast Cells # (Man)     (0)  k/uL


 


Macrocytosis     


 


Potassium     (3.5-5.1)  mmol/L


 


Chloride     ()  mmol/L


 


Creatinine     (0.66-1.25)  mg/dL


 


POC Glucose (mg/dL)     ()  mg/dL


 


Calcium     (8.4-10.2)  mg/dL


 


AST     (17-59)  U/L


 


ALT     (4-49)  U/L


 


Total Protein     (6.3-8.2)  g/dL


 


Albumin     (3.5-5.0)  g/dL


 


RBC Folate  1,187 H    (280 - 791)  ng/mL


 


HCV RNA Qual (PCR)   DETECTED A   (Not detected)  


 


Hepatitis C RNA Quant    [id number] H  (0-0)  IU/mL














  07/11/22 07/11/22 07/12/22 Range/Units





  16:18 16:18 02:33 


 


WBC  3.0 L    (3.8-10.6)  k/uL


 


RBC  2.32 L    (4.30-5.90)  m/uL


 


Hgb  8.6 L    (13.0-17.5)  gm/dL


 


Hct  26.3 L    (39.0-53.0)  %


 


MCV  113.3 H    (80.0-100.0)  fL


 


MCH  37.1 H    (25.0-35.0)  pg


 


RDW  16.3 H    (11.5-15.5)  %


 


Plt Count  60 L    (150-450)  k/uL


 


Blast Cells %  6 H*    %


 


Neutrophils # (Manual)  1.29 L    (1.3-7.7)  k/uL


 


Lymphocytes # (Manual)     (1.0-4.8)  k/uL


 


Blast Cells # (Man)  0.18 H    (0)  k/uL


 


Macrocytosis  Marked A    


 


Potassium   3.3 L   (3.5-5.1)  mmol/L


 


Chloride   111 H   ()  mmol/L


 


Creatinine   0.65 L   (0.66-1.25)  mg/dL


 


POC Glucose (mg/dL)    117 H  ()  mg/dL


 


Calcium   8.1 L   (8.4-10.2)  mg/dL


 


AST   105 H   (17-59)  U/L


 


ALT   122 H   (4-49)  U/L


 


Total Protein   5.7 L   (6.3-8.2)  g/dL


 


Albumin   3.4 L   (3.5-5.0)  g/dL


 


RBC Folate     (280 - 791)  ng/mL


 


HCV RNA Qual (PCR)     (Not detected)  


 


Hepatitis C RNA Quant     (0-0)  IU/mL














  07/12/22 07/12/22 Range/Units





  07:20 07:20 


 


WBC  3.0 L   (3.8-10.6)  k/uL


 


RBC  2.41 L   (4.30-5.90)  m/uL


 


Hgb  9.0 L   (13.0-17.5)  gm/dL


 


Hct  28.2 L   (39.0-53.0)  %


 


MCV  117.1 H   (80.0-100.0)  fL


 


MCH  37.2 H   (25.0-35.0)  pg


 


RDW  16.8 H   (11.5-15.5)  %


 


Plt Count  59 L   (150-450)  k/uL


 


Blast Cells %  7 H*   %


 


Neutrophils # (Manual)    (1.3-7.7)  k/uL


 


Lymphocytes # (Manual)  0.99 L   (1.0-4.8)  k/uL


 


Blast Cells # (Man)  0.21 H   (0)  k/uL


 


Macrocytosis  Marked A   


 


Potassium    (3.5-5.1)  mmol/L


 


Chloride   114 H  ()  mmol/L


 


Creatinine    (0.66-1.25)  mg/dL


 


POC Glucose (mg/dL)    ()  mg/dL


 


Calcium   8.0 L  (8.4-10.2)  mg/dL


 


AST   83 H  (17-59)  U/L


 


ALT   109 H  (4-49)  U/L


 


Total Protein   5.5 L  (6.3-8.2)  g/dL


 


Albumin   3.3 L  (3.5-5.0)  g/dL


 


RBC Folate    (280 - 791)  ng/mL


 


HCV RNA Qual (PCR)    (Not detected)  


 


Hepatitis C RNA Quant    (0-0)  IU/mL








                      Microbiology - Last 24 Hours (Table)











 07/08/22 17:01 Blood Culture - Preliminary





 Blood    No Growth after 72 hours


 


 07/08/22 16:47 Blood Culture - Preliminary





 Blood    No Growth after 72 hours

## 2022-07-12 NOTE — P.PN
Subjective


Progress Note Date: 07/12/22





Hospital course:


62 years old gentleman who presented with a chief, no fall head injury.  Patient

apparently passed out 3 times in the last 3 days prior to the day of admission. 

He reports losing consciousness.  Patient at home is on Eliquis.  Patient past 

medical history significant for hypertension COPD history of DVT on Eliquis 

ongoing tobacco abuse.  Patient was admitted for syncope.  Patient had computed 

tomography scan echocardiogram was negative for any acute finding.  Chest CTA 

report mentions small pulmonary embolism cannot be excluded.  Lower extremity 

duplex ultrasound was negative, EKG showed questionable epileptiform discharges.

 Patient was started on Keppra by neurology





Patient was also noted to have macrocytic anemia and abnormal peripheral smear 

with blast cells and immature cells term cytopenia, oncology was consulted and 

they're planning bone marrow biopsy, surgery consulted and they're planning 

colonoscopy and endoscopy for anemia workup





Subjective


Patient seen and evaluated at bedside, pending bone marrow biopsy tomorrow





Physical exam


General: Patient is confused today 


Head: atraumatic, normocephalic, symmetric


Eyes: no lid lesion], anicteric sclera


Mouth: no lip lesion, mucus membranes moist


Cardiovascular: S1S2 reg rate and rhythm, no murmur, no gallop


Lungs: Bilateral equal air entry, no wheezing no rhonchi no crackles.


Abdominal: soft,  nontender to palpation, no guarding, no appreciable 

organomegaly


Ext: no gross muscle atrophy, no edema extremities warm to suppose a positive


Neuro: Alert oriented  x 1-2 , nonfocal exam





Assessment and plan





Syncopal episode with Encephalopathy 


CT negative, infectious workup negative, Abnormal EEG, seizures cannot be 

excluded, anemia workup as mentioned below


Patient started on Keppra by neurology


MRI couldnt be done per neurology as pt had IVC filter 


EEG 2.5 hours pending 


Will DC baclofen, pregabalin prednisone as these can affect mental status 


Echocardiogram negative, plan for her 30 day event monitor with a cardiology 

follow-up on discharge


Appreciated recommendations from Neurology and cardiology





Macrocytic Anemia and thrombocytopenia, abnormal per flow smear positive for 

blast cells along with other immature blood cells


Checking B12 folate patient was started


Patient has blasts on exam, Oncology consulted planning for bone marrow biopsy 

possibly tomorrow Wednesday


General surgery was consulted for a scope but he canceled the procedure pending 

bone marrow biopsy





Questionable pulmonary embolism on CTA


Continue Eliquis next line further recommendations regarding anticoagulation 

from oncology pending








COPD, currently not in exacerbation 


Continue home inhalers


Initally was started on oral prednisone , will dc 





Abnormal ultrasound liver


Liver ultrasound showing cholelithiasis, mild gallbladder wall thickening 

consistent with possible cholecystitis


HIDA negative 





Chronic active hepatitis C


-New diagnosis


-Infectious disease consulted





Macrocytic anemia secondary to B12 deficiency


-Start on B12 replacement 1000 g IM daily for 3 days then weekly for 4 weeks 

then monthly





Chronic nicotine dependence


Nicotine patch as needed





Obesity








DVT prophylaxis: SCDs


CODE STATUS: Full code


Discharge plan: To be determined pending clinical improvement, pending workup











Objective





- Vital Signs


Vital signs: 


                                   Vital Signs











Temp  96.9 F L  07/12/22 15:09


 


Pulse  67   07/12/22 16:03


 


Resp  12   07/12/22 16:03


 


BP  118/56   07/12/22 15:09


 


Pulse Ox  95   07/12/22 15:51


 


FiO2      








                                 Intake & Output











 07/11/22 07/12/22 07/12/22





 18:59 06:59 18:59


 


Intake Total  120 


 


Balance  120 


 


Weight   121.563 kg


 


Intake:   


 


  Oral  120 


 


Other:   


 


  Voiding Method Incontinent Incontinent Urinal


 


  # Voids 1 0 














- Labs


CBC & Chem 7: 


                                 07/12/22 07:20





                                 07/12/22 07:20


Labs: 


                  Abnormal Lab Results - Last 24 Hours (Table)











  07/08/22 07/11/22 07/11/22 Range/Units





  17:01 16:18 16:18 


 


WBC   3.0 L   (3.8-10.6)  k/uL


 


RBC   2.32 L   (4.30-5.90)  m/uL


 


Hgb   8.6 L   (13.0-17.5)  gm/dL


 


Hct   26.3 L   (39.0-53.0)  %


 


MCV   113.3 H   (80.0-100.0)  fL


 


MCH   37.1 H   (25.0-35.0)  pg


 


RDW   16.3 H   (11.5-15.5)  %


 


Plt Count   60 L   (150-450)  k/uL


 


Blast Cells %   6 H*   %


 


Neutrophils # (Manual)   1.29 L   (1.3-7.7)  k/uL


 


Lymphocytes # (Manual)     (1.0-4.8)  k/uL


 


Blast Cells # (Man)   0.18 H   (0)  k/uL


 


Macrocytosis   Marked A   


 


Potassium    3.3 L  (3.5-5.1)  mmol/L


 


Chloride    111 H  ()  mmol/L


 


Creatinine    0.65 L  (0.66-1.25)  mg/dL


 


POC Glucose (mg/dL)     ()  mg/dL


 


Calcium    8.1 L  (8.4-10.2)  mg/dL


 


AST    105 H  (17-59)  U/L


 


ALT    122 H  (4-49)  U/L


 


Total Protein    5.7 L  (6.3-8.2)  g/dL


 


Albumin    3.4 L  (3.5-5.0)  g/dL


 


Hepatitis C RNA Quant  [id number] H    (0-0)  IU/mL














  07/12/22 07/12/22 07/12/22 Range/Units





  02:33 07:20 07:20 


 


WBC   3.0 L   (3.8-10.6)  k/uL


 


RBC   2.41 L   (4.30-5.90)  m/uL


 


Hgb   9.0 L   (13.0-17.5)  gm/dL


 


Hct   28.2 L   (39.0-53.0)  %


 


MCV   117.1 H   (80.0-100.0)  fL


 


MCH   37.2 H   (25.0-35.0)  pg


 


RDW   16.8 H   (11.5-15.5)  %


 


Plt Count   59 L   (150-450)  k/uL


 


Blast Cells %   7 H*   %


 


Neutrophils # (Manual)     (1.3-7.7)  k/uL


 


Lymphocytes # (Manual)   0.99 L   (1.0-4.8)  k/uL


 


Blast Cells # (Man)   0.21 H   (0)  k/uL


 


Macrocytosis   Marked A   


 


Potassium     (3.5-5.1)  mmol/L


 


Chloride    114 H  ()  mmol/L


 


Creatinine     (0.66-1.25)  mg/dL


 


POC Glucose (mg/dL)  117 H    ()  mg/dL


 


Calcium    8.0 L  (8.4-10.2)  mg/dL


 


AST    83 H  (17-59)  U/L


 


ALT    109 H  (4-49)  U/L


 


Total Protein    5.5 L  (6.3-8.2)  g/dL


 


Albumin    3.3 L  (3.5-5.0)  g/dL


 


Hepatitis C RNA Quant     (0-0)  IU/mL








                      Microbiology - Last 24 Hours (Table)











 07/08/22 17:01 Blood Culture - Preliminary





 Blood    No Growth after 72 hours


 


 07/08/22 16:47 Blood Culture - Preliminary





 Blood    No Growth after 72 hours

## 2022-07-12 NOTE — P.PN
Subjective


Progress Note Date: 07/12/22


Principal diagnosis: 





pancytopenia





In follow-up today patient is sitting up in bed, he is slow to respond to 

questions, he denies any nausea, sweats or bleeding.





Objective





- Vital Signs


Vital signs: 


                                   Vital Signs











Temp  96.9 F L  07/12/22 15:09


 


Pulse  67   07/12/22 16:03


 


Resp  12   07/12/22 16:03


 


BP  118/56   07/12/22 15:09


 


Pulse Ox  95   07/12/22 15:51


 


FiO2      








                                 Intake & Output











 07/11/22 07/12/22 07/12/22





 18:59 06:59 18:59


 


Intake Total  120 


 


Balance  120 


 


Weight   121.563 kg


 


Intake:   


 


  Oral  120 


 


Other:   


 


  Voiding Method Incontinent Incontinent Urinal


 


  # Voids 1 0 














- Constitutional


General appearance: Present: cooperative, no acute distress, obese





- EENT


Eyes: Present: anicteric sclerae, EOMI


ENT: Present: hearing grossly normal





- Respiratory


Respiratory: bilateral: CTA, diminished





- Cardiovascular


Rhythm: regular


Heart sounds: normal: S1, S2


Abnormal Heart Sounds: Absent: systolic murmur, diastolic murmur, rub, S3 

Gallop, S4 Gallop, click, other





- Peripheral edema


  ** leg


Peripheral Edema: bilateral: None





- Gastrointestinal


General gastrointestinal: Present: normal bowel sounds, soft





- Neurologic


Neurologic: Present: CNII-XII intact (grossly)





- Labs


CBC & Chem 7: 


                                 07/12/22 07:20





                                 07/12/22 07:20


Labs: 


                  Abnormal Lab Results - Last 24 Hours (Table)











  07/08/22 07/11/22 07/11/22 Range/Units





  17:01 16:18 16:18 


 


WBC   3.0 L   (3.8-10.6)  k/uL


 


RBC   2.32 L   (4.30-5.90)  m/uL


 


Hgb   8.6 L   (13.0-17.5)  gm/dL


 


Hct   26.3 L   (39.0-53.0)  %


 


MCV   113.3 H   (80.0-100.0)  fL


 


MCH   37.1 H   (25.0-35.0)  pg


 


RDW   16.3 H   (11.5-15.5)  %


 


Plt Count   60 L   (150-450)  k/uL


 


Blast Cells %   6 H*   %


 


Neutrophils # (Manual)   1.29 L   (1.3-7.7)  k/uL


 


Lymphocytes # (Manual)     (1.0-4.8)  k/uL


 


Blast Cells # (Man)   0.18 H   (0)  k/uL


 


Macrocytosis   Marked A   


 


Potassium    3.3 L  (3.5-5.1)  mmol/L


 


Chloride    111 H  ()  mmol/L


 


Creatinine    0.65 L  (0.66-1.25)  mg/dL


 


POC Glucose (mg/dL)     ()  mg/dL


 


Calcium    8.1 L  (8.4-10.2)  mg/dL


 


AST    105 H  (17-59)  U/L


 


ALT    122 H  (4-49)  U/L


 


Total Protein    5.7 L  (6.3-8.2)  g/dL


 


Albumin    3.4 L  (3.5-5.0)  g/dL


 


Hepatitis C RNA Quant  [id number] H    (0-0)  IU/mL














  07/12/22 07/12/22 07/12/22 Range/Units





  02:33 07:20 07:20 


 


WBC   3.0 L   (3.8-10.6)  k/uL


 


RBC   2.41 L   (4.30-5.90)  m/uL


 


Hgb   9.0 L   (13.0-17.5)  gm/dL


 


Hct   28.2 L   (39.0-53.0)  %


 


MCV   117.1 H   (80.0-100.0)  fL


 


MCH   37.2 H   (25.0-35.0)  pg


 


RDW   16.8 H   (11.5-15.5)  %


 


Plt Count   59 L   (150-450)  k/uL


 


Blast Cells %   7 H*   %


 


Neutrophils # (Manual)     (1.3-7.7)  k/uL


 


Lymphocytes # (Manual)   0.99 L   (1.0-4.8)  k/uL


 


Blast Cells # (Man)   0.21 H   (0)  k/uL


 


Macrocytosis   Marked A   


 


Potassium     (3.5-5.1)  mmol/L


 


Chloride    114 H  ()  mmol/L


 


Creatinine     (0.66-1.25)  mg/dL


 


POC Glucose (mg/dL)  117 H    ()  mg/dL


 


Calcium    8.0 L  (8.4-10.2)  mg/dL


 


AST    83 H  (17-59)  U/L


 


ALT    109 H  (4-49)  U/L


 


Total Protein    5.5 L  (6.3-8.2)  g/dL


 


Albumin    3.3 L  (3.5-5.0)  g/dL


 


Hepatitis C RNA Quant     (0-0)  IU/mL








                      Microbiology - Last 24 Hours (Table)











 07/08/22 17:01 Blood Culture - Preliminary





 Blood    No Growth after 72 hours


 


 07/08/22 16:47 Blood Culture - Preliminary





 Blood    No Growth after 72 hours














Assessment and Plan


(1) Pancytopenia


Current Visit: Yes   Status: Acute   Priority: High   Code(s): D61.818 - OTHER 

PANCYTOPENIA   SNOMED Code(s): 838914102


   


Plan: 





Reviewed with patient bone marrow biopsy and aspirate purpose, procedure, risks 

versus benefits including risk for infection or bleeding.  Explained that this 

is being pursued since his blood counts are abnormal and no other cause has been

identified on workup so, biopsy is necessary to evaluate.  he verbalized 

understanding and is agreeable to proceed.  Nothing by mouth after midnight.  

Consent for procedure.  Scheduled for 7/13/22 at 12:30.

## 2022-07-13 LAB
BLASTS # BLD MANUAL: 0.22 K/UL
CELLS COUNTED: 200
ERYTHROCYTE [DISTWIDTH] IN BLOOD BY AUTOMATED COUNT: 2.49 M/UL (ref 4.3–5.9)
ERYTHROCYTE [DISTWIDTH] IN BLOOD: 16.9 % (ref 11.5–15.5)
HCT VFR BLD AUTO: 29.5 % (ref 39–53)
HGB BLD-MCNC: 8.8 GM/DL (ref 13–17.5)
LYMPHOCYTES # BLD MANUAL: 1.02 K/UL (ref 1–4.8)
MCH RBC QN AUTO: 35.5 PG (ref 25–35)
MCHC RBC AUTO-ENTMCNC: 30 G/DL (ref 31–37)
MCV RBC AUTO: 118.3 FL (ref 80–100)
MONOCYTES # BLD MANUAL: 0.48 K/UL (ref 0–1)
MYELOCYTES # BLD MANUAL: 0.03 K/UL
NEUTROPHILS NFR BLD MANUAL: 46 %
NEUTS SEG # BLD MANUAL: 1.47 K/UL (ref 1.3–7.7)
PLATELET # BLD AUTO: 79 K/UL (ref 150–450)
WBC # BLD AUTO: 3.2 K/UL (ref 3.8–10.6)

## 2022-07-13 PROCEDURE — 07DR3ZX EXTRACTION OF ILIAC BONE MARROW, PERCUTANEOUS APPROACH, DIAGNOSTIC: ICD-10-PCS

## 2022-07-13 PROCEDURE — 079T3ZX DRAINAGE OF BONE MARROW, PERCUTANEOUS APPROACH, DIAGNOSTIC: ICD-10-PCS

## 2022-07-13 RX ADMIN — HYDROCODONE BITARTRATE AND ACETAMINOPHEN PRN EACH: 5; 325 TABLET ORAL at 12:20

## 2022-07-13 RX ADMIN — FOLIC ACID SCH MG: 1 TABLET ORAL at 09:22

## 2022-07-13 RX ADMIN — BUDESONIDE AND FORMOTEROL FUMARATE DIHYDRATE SCH PUFF: 80; 4.5 AEROSOL RESPIRATORY (INHALATION) at 19:27

## 2022-07-13 RX ADMIN — PANTOPRAZOLE SODIUM SCH MG: 40 INJECTION, POWDER, FOR SOLUTION INTRAVENOUS at 20:42

## 2022-07-13 RX ADMIN — BUDESONIDE AND FORMOTEROL FUMARATE DIHYDRATE SCH PUFF: 80; 4.5 AEROSOL RESPIRATORY (INHALATION) at 08:07

## 2022-07-13 RX ADMIN — HYDROCODONE BITARTRATE AND ACETAMINOPHEN PRN EACH: 5; 325 TABLET ORAL at 18:52

## 2022-07-13 RX ADMIN — PANTOPRAZOLE SODIUM SCH MG: 40 INJECTION, POWDER, FOR SOLUTION INTRAVENOUS at 09:24

## 2022-07-13 RX ADMIN — LEVETIRACETAM SCH MLS/HR: 100 INJECTION, SOLUTION, CONCENTRATE INTRAVENOUS at 12:19

## 2022-07-13 RX ADMIN — IPRATROPIUM BROMIDE AND ALBUTEROL SULFATE SCH ML: .5; 3 SOLUTION RESPIRATORY (INHALATION) at 19:27

## 2022-07-13 RX ADMIN — CYANOCOBALAMIN SCH MCG: 1000 INJECTION, SOLUTION INTRAMUSCULAR; SUBCUTANEOUS at 09:22

## 2022-07-13 RX ADMIN — IPRATROPIUM BROMIDE AND ALBUTEROL SULFATE SCH ML: .5; 3 SOLUTION RESPIRATORY (INHALATION) at 08:07

## 2022-07-13 RX ADMIN — OXYCODONE HYDROCHLORIDE AND ACETAMINOPHEN SCH EACH: 10; 325 TABLET ORAL at 09:22

## 2022-07-13 RX ADMIN — MONTELUKAST SODIUM SCH MG: 10 TABLET, FILM COATED ORAL at 20:42

## 2022-07-13 RX ADMIN — ACETAMINOPHEN PRN MG: 325 TABLET, FILM COATED ORAL at 00:40

## 2022-07-13 RX ADMIN — POTASSIUM CHLORIDE SCH MEQ: 20 TABLET, EXTENDED RELEASE ORAL at 09:23

## 2022-07-13 RX ADMIN — IPRATROPIUM BROMIDE AND ALBUTEROL SULFATE SCH ML: .5; 3 SOLUTION RESPIRATORY (INHALATION) at 15:24

## 2022-07-13 RX ADMIN — IPRATROPIUM BROMIDE AND ALBUTEROL SULFATE SCH: .5; 3 SOLUTION RESPIRATORY (INHALATION) at 11:24

## 2022-07-13 NOTE — P.PN
Subjective





Hospital course:


62 years old gentleman who presented with a chief, no fall head injury.  Patient

apparently passed out 3 times in the last 3 days prior to the day of admission. 

He reports losing consciousness.  Patient at home is on Eliquis.  Patient past 

medical history significant for hypertension COPD history of DVT on Eliquis 

ongoing tobacco abuse.  Patient was admitted for syncope.  Patient had computed 

tomography scan echocardiogram was negative for any acute finding.  Chest CTA 

report mentions small pulmonary embolism cannot be excluded.  Lower extremity 

duplex ultrasound was negative, EKG showed questionable epileptiform discharges.

 Patient was started on Keppra by neurology





Patient was also noted to have macrocytic anemia and abnormal peripheral smear 

with blast cells and immature cells term cytopenia, oncology was consulted and 

they're planning bone marrow biopsy, surgery consulted and they're planning 

colonoscopy and endoscopy for anemia workup





Subjective


Patient seen and evaluated at bedside, he denies any chest pain or shortness of 

breath.  He is complaining of mild back pain from laying on the bed.  pending 

bone marrow biopsy today.  Otherwise no acute changes overnight





Physical exam


General: Patient is confused today 


Head: atraumatic, normocephalic, symmetric


Eyes: no lid lesion], anicteric sclera


Mouth: no lip lesion, mucus membranes moist


Cardiovascular: S1S2 reg rate and rhythm, no murmur, no gallop


Lungs: Bilateral equal air entry, no wheezing no rhonchi no crackles.


Abdominal: soft,  nontender to palpation, no guarding, no appreciable 

organomegaly


Ext: no gross muscle atrophy, no edema extremities warm to suppose a positive


Neuro: Alert oriented  x 1-2 , nonfocal exam





Assessment and plan





Syncopal episode with Encephalopathy 


CT negative, infectious workup negative, Abnormal EEG, seizures cannot be 

excluded, anemia workup as mentioned below


Patient started on Keppra by neurology


MRI couldnt be done per neurology as pt had IVC filter 


EEG 2.5 hours pending 


Will DC baclofen, pregabalin prednisone as these can affect mental status 


Echocardiogram negative, plan for her 30 day event monitor with a cardiology 

follow-up on discharge


Appreciated recommendations from Neurology and cardiology





Macrocytic Anemia and thrombocytopenia, abnormal per flow smear positive for 

blast cells along with other immature blood cells


Checking B12 folate patient was started


Patient has blasts on exam, Oncology consulted planning for bone marrow biopsy 

possibly tomorrow Wednesday


General surgery was consulted for a scope but he canceled the procedure pending 

bone marrow biopsy





Questionable pulmonary embolism on CTA


Continue Eliquis next line further recommendations regarding anticoagulation 

from oncology pending








COPD, currently not in exacerbation 


Continue home inhalers


Initally was started on oral prednisone and then discontinueD








Abnormal ultrasound liver


Liver ultrasound showing cholelithiasis, mild gallbladder wall thickening 

consistent with possible cholecystitis


HIDA negative 





Chronic active hepatitis C


-New diagnosis


-HIV test ordered by infectious disease


-Infectious disease following





Macrocytic anemia secondary to B12 deficiency


-Started on B12 replacement 1000 g IM daily for 3 days then weekly for 4 weeks 

then monthly





Chronic nicotine dependence


Nicotine patch as needed





Obesity BMI 37








DVT prophylaxis: SCDs


CODE STATUS: Full code


Discharge plan: To be determined pending clinical improvement, pending workup











Objective





- Vital Signs


Vital signs: 


                                   Vital Signs











Temp  97.2 F L  07/13/22 08:00


 


Pulse  74   07/13/22 12:00


 


Resp  16   07/13/22 12:00


 


BP  134/65   07/13/22 12:00


 


Pulse Ox  95   07/13/22 12:00


 


FiO2      








                                 Intake & Output











 07/12/22 07/13/22 07/13/22





 18:59 06:59 18:59


 


Intake Total 358  


 


Output Total 125 437 100


 


Balance 233 -437 -100


 


Weight 121.563 kg  


 


Intake:   


 


  Oral 358  


 


Output:   


 


  Urine 125 200 100


 


  Post Void Residual  237 


 


Other:   


 


  Voiding Method Urinal Urinal Urinal


 


  # Bowel Movements 1  














- Labs


CBC & Chem 7: 


                                 07/12/22 07:20





                                 07/12/22 07:20


Labs: 


                  Abnormal Lab Results - Last 24 Hours (Table)











  07/08/22 Range/Units





  17:01 


 


Hepatitis C RNA Quant  [id number] H  (0-0)  IU/mL








                      Microbiology - Last 24 Hours (Table)











 07/08/22 17:01 Blood Culture - Preliminary





 Blood    No Growth after 96 hours


 


 07/08/22 16:47 Blood Culture - Preliminary





 Blood    No Growth after 96 hours

## 2022-07-13 NOTE — P.PCN
Date of Procedure: 07/13/22


Preoperative Diagnosis: 


Thrombocytopenia, abnormal peripheral smear containing blasts


Postoperative Diagnosis: 


Same


Procedure(s) Performed: 


Bone marrow aspiration and biopsy


Anesthesia: MAC


Surgeon: Rolando Mendoza


Assistant #1: Stated None


Estimated Blood Loss (ml): 1


Pathology: other


Condition: stable


Disposition: floor


Indications for Procedure: 


 New diagnosis of pancytopenia, with abnormal peripheral smear showing 

significant left shift.  Lab workup for other causes negative.


Operative Findings: 


Adequate sample


Description of Procedure: 


The procedure was discussed in detail with the patient on the floor.  Informed 

consent was obtained on the floor.  He was brought to the outpatient endoscopy 

suite, and placed in the left lateral decubitus position.  The area over both 

posterior iliac crest was cleaned and prepped with chlorhexidine and sterile 

draping.  IV sedation was then initiated.  Local anesthesia was administered 

with lidocaine to the right posterior iliac crest.  A Jamshidi needle was then 

inserted and bone marrow aspirate and biopsy obtained.  The procedure was 

technically difficult because of the patient's body habitus, and very sclerotic 

bone.  Therefore 3 passes in total were made with ultimately adequate samples 

being obtained.


 Hemostasis was easily achieved on completion of the procedure.  Blood loss was 

minimal, and recovery from sedation was satisfactory.  He appeared to have 

tolerated the procedure well without any obvious immediate complications.

## 2022-07-13 NOTE — CT
EXAM:

  CT Head Without Intravenous Contrast

 

CLINICAL HISTORY:

  ITS.REASON CT Reason: new throbbing headache

 

TECHNIQUE:

  Axial computed tomography images of the head/brain without intravenous 

contrast.  CTDI is 49.2 mGy and DLP is 1121.4 mGy-cm.  This CT exam was 

performed using one or more of the following dose reduction techniques: 

automated exposure control, adjustment of the mA and/or kV according to 

patient size, and/or use of iterative reconstruction technique.

 

COMPARISON:

  No relevant prior studies available.

 

FINDINGS:

  Brain:  No hemorrhage or mass effect.

  Ventricles:  No hydrocephalus.

  Bones/joints:  Unremarkable.

  Soft tissues:  Unremarkable.

  Sinuses:  No air fluid level.

  Mastoid air cells:  Clear.

 

IMPRESSION:     

  No acute hemorrhage, hydrocephalus, or mass effect.

## 2022-07-13 NOTE — P.PN
Subjective


Progress Note Date: 07/13/22 07/13/2022: Patient was seen for a follow-up.  Patient denies headache at this 

time.  Patient states that he did have headache earlier.  No dizziness, no 

passing out.  No seizures.  Patient's telemetry monitoring showing sinus rhythm 

in the 77 range.  No new focal symptoms.





07/12/2022: Patient was seen for a follow-up.  Patient has clinically remarkably

improved.  Patient able to answer appropriately.  Patient states that he never 

has any history of seizures in his whole life.  Patient states that his seizure 

type spells "came out of nowhere".  He says that he remembers that he passed out

4 times, each time he was standing up on his feet for some time before he passed

out (not merely occurring on standing up).  He became lightheaded, started 

shaking and passed out.  He did bite his tongue with the first syncopal 

spells/seizure and lost control of urine with others.  He passed out 4 times in 

4 days.  He states that he is not sure how long he was out a standing, but p

erhaps for "5 minutes".  Patient at present denies headache, no dizziness.  No 

problem with the vision.  His telemetry monitoring showing sinus rhythm between 

40s and 110s.  Some PVCs and PACs.





Patient states that his father and paternal grandfather also has history of 

epilepsy.





07/11/2022: Patient initially seen by Dr. John Cavanaugh.  Please refer to his note 

for details.  Patient is a 62-year-old male having syncopal episode and 

clinically seizure-like activity.  The initial EEG read by Dr. Cavanaugh, who 

noticed there was rare epileptiform discharges frontotemporal central.  He was 

started on Keppra 1 g twice a day.  Repeat EEG was performed today.  Patient 

also has possible myelodysplastic syndrome, newly diagnosed and getting workup.





Patient at present complaining of headache, which he admitted that is severe.  

Per nursing report, patient has been thrashing, yelling, screaming, difficult to

manage.  He is abusing words.  He is trying to climb out of bed, fall risk.  

Please refer to examination below.  Nursing staff has not noticed any seizure-

like activity, only aggression, agitation.





Objective





- Vital Signs


Vital signs: 


                                   Vital Signs











Temp  97.2 F L  07/13/22 08:00


 


Pulse  74   07/13/22 12:00


 


Resp  16   07/13/22 12:00


 


BP  134/65   07/13/22 12:00


 


Pulse Ox  95   07/13/22 12:00


 


FiO2      








                                 Intake & Output











 07/12/22 07/13/22 07/13/22





 18:59 06:59 18:59


 


Intake Total 358  100


 


Output Total 125 437 100


 


Balance 233 -437 0


 


Weight 121.563 kg  


 


Intake:   


 


  IV   100


 


  Oral 358  


 


Output:   


 


  Urine 125 200 100


 


  Post Void Residual  237 


 


Other:   


 


  Voiding Method Urinal Urinal Urinal


 


  # Bowel Movements 1  














- Exam





On examination patient is a late middle aged  male, who appears older 

than his stated age.  He is fully alert and awake today.  He is answering 

appropriately. 





Patient knows it is July 2022 and that he is in [facility] in order 

[facility] and name of the current president. 





Speech and language functions are normal.  He can name and repeat very well.  No

aphasia or dysarthria.  On cranial examination, pupils are equal, round and 

reacting, visual fields are full on confrontation with no neglect, extraocular 

muscles are intact, face appears symmetric.  On muscle strength testing there is

no pronator drift and the strength is normal in arms and legs distally and 

proximally except hip flexion which is 4to4- bilaterally. 





Reflexes are symmetric 2+ at the biceps, 2+ brachioradialis, 3 at the knees, 2+ 

ankles and plantars downgoing bilaterally.  Tone and bulk of muscles normal.





No ataxia for finger-to-nose or heel-to-shin testing bilaterally.





Sensory to touch is decreased in the right arm and right leg, but is decreased 

on the left side of the face as compared to the right side.





Gait deferred.





Patient has ecchymosis on his skin.  Some peripheral edema.  Abdomen appears 

soft and nontender.  





- Labs


CBC & Chem 7: 


                                 07/13/22 13:41





                                 07/12/22 07:20


Labs: 


                  Abnormal Lab Results - Last 24 Hours (Table)











  07/13/22 Range/Units





  13:41 


 


WBC  3.2 L  (3.8-10.6)  k/uL


 


RBC  2.49 L  (4.30-5.90)  m/uL


 


Hgb  8.8 L  (13.0-17.5)  gm/dL


 


Hct  29.5 L  (39.0-53.0)  %


 


MCV  118.3 H  (80.0-100.0)  fL


 


MCH  35.5 H  (25.0-35.0)  pg


 


MCHC  30.0 L  (31.0-37.0)  g/dL


 


RDW  16.9 H  (11.5-15.5)  %


 


Plt Count  79 L  (150-450)  k/uL


 


Blast Cells %  7 H*  %


 


Myelocytes # (Manual)  0.03 H  (0)  k/uL


 


Blast Cells # (Man)  0.22 H  (0)  k/uL


 


Macrocytosis  Marked A  


 


Retic Count  2.7 H  (0.5-2.0)  %








                      Microbiology - Last 24 Hours (Table)











 07/08/22 17:01 Blood Culture - Preliminary





 Blood    No Growth after 96 hours


 


 07/08/22 16:47 Blood Culture - Preliminary





 Blood    No Growth after 96 hours














Assessment and Plan


Assessment: 





Recurrent Syncopal-like episodes for past 5 days prior to admission, seems like 

convulsive syncope, less likely seizures, as all 4 of them occurred while he was

standing up on his feet for some time before he had a presyncopal spells and 

then passed out.  However he did have urinary incontinence, tongue bite and some

episodes shaking of uppers, raising concern for seizure. 


EEG reported as  questionable rare fronto-temporal central epileptiform 

discharges.  No seizures (as per Dr. Cavanaugh report).  Repeat EEG negative for any

epileptiform activity, only background slowing.


Encephalopathy due to multifactorial: likely related to syncope, metabolic 

encephlopathy and leukemia/myelodysplastic syndrome.   Encephalopathy now 

resolved.  Patient's mentation is back to normal.





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


Severe neutropenia, anemia with hemoglobin 8.4, thrombocytopenia, hypokalemia, 

elevated liver enzymes


Vitamin B12 deficiency, borderline folate.





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  


Chronic neck pain


Chronic lower back pain


Chronic anemia


Hypertension


History of DVT on eliquis


COPD


History of hepatitis 


Nicotine use




















Plan: 





Continue Keppra to 750 mg every 12 hours.





Patient undergoing bone marrow biopsy today.





Repeat EEG performed 07/12/2022 showed worsening of background, consistent with 

toxic metabolic encephalopathy.  No definitive epileptiform activity was seen.





Patient denies headaches.  Mentation back to normal.  





Repeat CT head with contrast from 07/10/2022 showed no acute process.  I 

personally reviewed CT head, agree with the findings.  Cannot obtain MRI of the 

brain since the patient has IVC filter.  Recommend MRI of the brain with and 

without as an outpatient that is a IVC filter compatible.


   


Oncology team is on board is seems the patient is scheduled for bone marrow 

biopsy this Tuesday


Patient is on fall precautions


On seizure precautions seizure pads.


Cardiology is consulted.


Patient was counseled on tobacco cessation.


Will defer the rest of medical management to the primary team.


We will consider decreasing dose of Keppra to 500 mg twice a day.


Neurologically clear.  Upon discharge, patient needs to follow-up with 

neurologist as outpatient within 1-2 weeks.

## 2022-07-13 NOTE — P.PN
Subjective


Progress Note Date: 07/13/22





CHIEF COMPLAINT: Syncope





HISTORY OF PRESENT ILLNESS: Surgical service is following and concerns for patie

nt's anemia and possible GI bleed.  No bowel movements reported.  Patient's 

confusion and agitation is better today.  Patient is no longer requiring 

restraints.  Patient had bone marrow biopsy completed today.  Neurology is 

following in regards to syncope and encephalopathy.  Patient's hemoglobin had 

remained stable at 9.0.








Patient seen and examined with Dr. james





PHYSICAL EXAM: 


VITAL SIGNS: Reviewed.


GENERAL: Well-developed in no acute distress. 


ABDOMEN:  Soft.  Nondistended. Nontender. 


NEUROLOGIC: Less confused





ASSESSMENT: 


1.  Anemia


2.  Syncope


3.  Possible seizures








PLAN: 


-Surgical service will sign off.  Please reconsult if patient has any 

reoccurring GI bleed symptoms








Physician Assistant note has been reviewed by physician. Signing provider agrees

with the documented findings, assessment, and plan of care. 





Objective





- Vital Signs


Vital signs: 


                                   Vital Signs











Temp  97.2 F L  07/13/22 08:00


 


Pulse  74   07/13/22 12:00


 


Resp  16   07/13/22 12:00


 


BP  134/65   07/13/22 12:00


 


Pulse Ox  95   07/13/22 12:00


 


FiO2      








                                 Intake & Output











 07/12/22 07/13/22 07/13/22





 18:59 06:59 18:59


 


Intake Total 358  100


 


Output Total 125 437 100


 


Balance 233 -437 0


 


Weight 121.563 kg  


 


Intake:   


 


  IV   100


 


  Oral 358  


 


Output:   


 


  Urine 125 200 100


 


  Post Void Residual  237 


 


Other:   


 


  Voiding Method Urinal Urinal Urinal


 


  # Bowel Movements 1  














- Labs


CBC & Chem 7: 


                                 07/12/22 07:20





                                 07/12/22 07:20


Labs: 


                      Microbiology - Last 24 Hours (Table)











 07/08/22 17:01 Blood Culture - Preliminary





 Blood    No Growth after 96 hours


 


 07/08/22 16:47 Blood Culture - Preliminary





 Blood    No Growth after 96 hours

## 2022-07-13 NOTE — P.PN
Subjective


Progress Note Date: 07/13/22


Principal diagnosis: 





Abnormal CBC





Status post bone marrow biopsy today. 





Objective





- Vital Signs


Vital signs: 


                                   Vital Signs











Temp  97.2 F L  07/13/22 08:00


 


Pulse  74   07/13/22 12:00


 


Resp  16   07/13/22 12:00


 


BP  134/65   07/13/22 12:00


 


Pulse Ox  95   07/13/22 12:00


 


FiO2      








                                 Intake & Output











 07/12/22 07/13/22 07/13/22





 18:59 06:59 18:59


 


Intake Total 358  100


 


Output Total 125 437 100


 


Balance 233 -437 0


 


Weight 121.563 kg  


 


Intake:   


 


  IV   100


 


  Oral 358  


 


Output:   


 


  Urine 125 200 100


 


  Post Void Residual  237 


 


Other:   


 


  Voiding Method Urinal Urinal Urinal


 


  # Bowel Movements 1  














- Exam





- Constitutional


General appearance: Present: cooperative, no acute distress





- EENT


Eyes: Present: EOMI


ENT: Present: NA/AT





- Neck


Neck: Present: normal ROM





- Respiratory


Respiratory: bilateral: CTA





- Cardiovascular


Rhythm: regularly irregular





- Gastrointestinal


General gastrointestinal: Present: distended, soft





- Integumentary


Integumentary: Present: pale





- Neurologic


Neurologic: Present: CNII-XII intact





- Musculoskeletal


Musculoskeletal: Present: generalized weakness





- Psychiatric


Psychiatric: Present: A&O x's 3








- Labs


CBC & Chem 7: 


                                 07/12/22 07:20





                                 07/12/22 07:20


Labs: 


                      Microbiology - Last 24 Hours (Table)











 07/08/22 17:01 Blood Culture - Preliminary





 Blood    No Growth after 96 hours


 


 07/08/22 16:47 Blood Culture - Preliminary





 Blood    No Growth after 96 hours














Assessment and Plan


(1) Abnormal complete blood count


Narrative/Plan: 


COncern for primary acute bone marrow discorder


 - Bone Marrow Biopsy scheduled for Tuesday


LDH Elevated, B12 and FOlate suboptimal and supplement ordered





Neutropenia, no growth factor with concern of primary bone marrow disorder





Bone marrow biopsyperformed await results





Daily CBC and will transfuse Hg <7 Irradiated and Platelets less 10





NO ASA< NSAIS OR AC


Current Visit: Yes   Status: Acute   Code(s): R79.89 - OTHER SPECIFIED ABNORMAL 

FINDINGS OF BLOOD CHEMISTRY   SNOMED Code(s): 718059548


   





(2) Macrocytic anemia


Narrative/Plan: 


macrocytosis


Current Visit: Yes   Status: Acute   Code(s): D53.9 - NUTRITIONAL ANEMIA, UNSP

ECIFIED   SNOMED Code(s): 80132337


   





(3) Thrombocytopenia


Narrative/Plan: 


60K if drops below 50K, NO ASA, NSAIS or AC therapy


Monitor closely for bleeding


Current Visit: Yes   Status: Acute   Code(s): D69.6 - THROMBOCYTOPENIA, 

UNSPECIFIED   SNOMED Code(s): 069921696


   





(4) Hypokalemia


Current Visit: Yes   Status: Acute   Code(s): E87.6 - HYPOKALEMIA   SNOMED 

Code(s): 91110984


   


Plan: 





Status post bone marrow biopsy, will await results for more recommendations


Orders in


Monitor daily labs and for bleeding or infection in interim

## 2022-07-13 NOTE — P.PN
Subjective


Progress Note Date: 07/12/22 07/12/2022: Patient was seen for a follow-up.  Patient has clinically remarkably

improved.  Patient able to answer appropriately.  Patient states that he never 

has any history of seizures in his whole life.  Patient states that his seizure 

type spells "came out of nowhere".  He says that he remembers that he passed out

4 times, each time he was standing up on his feet for some time before he passed

out (not merely occurring on standing up).  He became lightheaded, started 

shaking and passed out.  He did bite his tongue with the first syncopal 

spells/seizure and lost control of urine with others.  He passed out 4 times in 

4 days.  He states that he is not sure how long he was out a standing, but 

perhaps for "5 minutes".  Patient at present denies headache, no dizziness.  No 

problem with the vision.  His telemetry monitoring showing sinus rhythm between 

40s and 110s.  Some PVCs and PACs.





Patient states that his father and paternal grandfather also has history of 

epilepsy.





07/11/2022: Patient initially seen by Dr. John Cavanaugh.  Please refer to his note 

for details.  Patient is a 62-year-old male having syncopal episode and 

clinically seizure-like activity.  The initial EEG read by Dr. Cavanaugh, who 

noticed there was rare epileptiform discharges frontotemporal central.  He was 

started on Keppra 1 g twice a day.  Repeat EEG was performed today.  Patient 

also has possible myelodysplastic syndrome, newly diagnosed and getting workup.





Patient at present complaining of headache, which he admitted that is severe.  

Per nursing report, patient has been thrashing, yelling, screaming, difficult to

manage.  He is abusing words.  He is trying to climb out of bed, fall risk.  

Please refer to examination below.  Nursing staff has not noticed any seizure-

like activity, only aggression, agitation.





Objective





- Vital Signs


Vital signs: 


                                   Vital Signs











Temp  96.9 F L  07/12/22 15:09


 


Pulse  67   07/12/22 16:03


 


Resp  12   07/12/22 16:03


 


BP  118/56   07/12/22 15:09


 


Pulse Ox  95   07/12/22 15:51


 


FiO2      








                                 Intake & Output











 07/11/22 07/12/22 07/12/22





 18:59 06:59 18:59


 


Intake Total  120 


 


Balance  120 


 


Weight   121.563 kg


 


Intake:   


 


  Oral  120 


 


Other:   


 


  Voiding Method Incontinent Incontinent Urinal


 


  # Voids 1 0 














- Exam





On examination patient is a late middle aged  male, who appears older 

than his stated age.  He is fully alert and awake today.  He is answering 

appropriately. 





Patient knows it is July 2022 and that he is in [facility] in [facility] and name of the current president. 





Speech and language functions are normal.  He can name and repeat very well.  No

aphasia or dysarthria.  On cranial examination, pupils are equal, round and 

reacting, visual fields are full on confrontation with no neglect, extraocular 

muscles are intact, face appears symmetric.  On muscle strength testing there is

no pronator drift and the strength is normal in arms and legs distally and p

roximally except hip flexion which is 4to4- bilaterally. 





Reflexes are symmetric 2+ at the biceps, 2+ brachioradialis, 3 at the knees, 2+ 

ankles and plantars downgoing bilaterally.  Tone and bulk of muscles normal.





No ataxia for finger-to-nose or heel-to-shin testing bilaterally.





Sensory to touch is decreased in the right arm and right leg, but is decreased 

on the left side of the face as compared to the right side.





Patient has ecchymosis on his skin.  Some peripheral edema.  Abdomen appears 

soft and nontender.  





- Labs


CBC & Chem 7: 


                                 07/12/22 07:20





                                 07/12/22 07:20


Labs: 


                  Abnormal Lab Results - Last 24 Hours (Table)











  07/08/22 07/11/22 07/11/22 Range/Units





  17:01 16:18 16:18 


 


WBC   3.0 L   (3.8-10.6)  k/uL


 


RBC   2.32 L   (4.30-5.90)  m/uL


 


Hgb   8.6 L   (13.0-17.5)  gm/dL


 


Hct   26.3 L   (39.0-53.0)  %


 


MCV   113.3 H   (80.0-100.0)  fL


 


MCH   37.1 H   (25.0-35.0)  pg


 


RDW   16.3 H   (11.5-15.5)  %


 


Plt Count   60 L   (150-450)  k/uL


 


Blast Cells %   6 H*   %


 


Neutrophils # (Manual)   1.29 L   (1.3-7.7)  k/uL


 


Lymphocytes # (Manual)     (1.0-4.8)  k/uL


 


Blast Cells # (Man)   0.18 H   (0)  k/uL


 


Macrocytosis   Marked A   


 


Potassium    3.3 L  (3.5-5.1)  mmol/L


 


Chloride    111 H  ()  mmol/L


 


Creatinine    0.65 L  (0.66-1.25)  mg/dL


 


POC Glucose (mg/dL)     ()  mg/dL


 


Calcium    8.1 L  (8.4-10.2)  mg/dL


 


AST    105 H  (17-59)  U/L


 


ALT    122 H  (4-49)  U/L


 


Total Protein    5.7 L  (6.3-8.2)  g/dL


 


Albumin    3.4 L  (3.5-5.0)  g/dL


 


Hepatitis C RNA Quant  [id number] H    (0-0)  IU/mL














  07/12/22 07/12/22 07/12/22 Range/Units





  02:33 07:20 07:20 


 


WBC   3.0 L   (3.8-10.6)  k/uL


 


RBC   2.41 L   (4.30-5.90)  m/uL


 


Hgb   9.0 L   (13.0-17.5)  gm/dL


 


Hct   28.2 L   (39.0-53.0)  %


 


MCV   117.1 H   (80.0-100.0)  fL


 


MCH   37.2 H   (25.0-35.0)  pg


 


RDW   16.8 H   (11.5-15.5)  %


 


Plt Count   59 L   (150-450)  k/uL


 


Blast Cells %   7 H*   %


 


Neutrophils # (Manual)     (1.3-7.7)  k/uL


 


Lymphocytes # (Manual)   0.99 L   (1.0-4.8)  k/uL


 


Blast Cells # (Man)   0.21 H   (0)  k/uL


 


Macrocytosis   Marked A   


 


Potassium     (3.5-5.1)  mmol/L


 


Chloride    114 H  ()  mmol/L


 


Creatinine     (0.66-1.25)  mg/dL


 


POC Glucose (mg/dL)  117 H    ()  mg/dL


 


Calcium    8.0 L  (8.4-10.2)  mg/dL


 


AST    83 H  (17-59)  U/L


 


ALT    109 H  (4-49)  U/L


 


Total Protein    5.5 L  (6.3-8.2)  g/dL


 


Albumin    3.3 L  (3.5-5.0)  g/dL


 


Hepatitis C RNA Quant     (0-0)  IU/mL








                      Microbiology - Last 24 Hours (Table)











 07/08/22 17:01 Blood Culture - Preliminary





 Blood    No Growth after 72 hours


 


 07/08/22 16:47 Blood Culture - Preliminary





 Blood    No Growth after 72 hours














Assessment and Plan


Assessment: 





Recurrent Syncopal-like episodes for past 5 days prior to admission, seems like 

convulsive syncope, less likely seizures, as all 4 of them occurred while he was

standing up on his feet for some time before he had a presyncopal spells and 

then passed out.  However he did have urinary incontinence, tongue bite and some

episodes shaking of uppers, raising concern for seizure. 


EEG reported as  questionable rare fronto-temporal central epileptiform 

discharges.  No seizures (as per Dr. Cavanaugh report).


Encephalopathy due to multifactorial: likely ?seizures versus syncope, metabolic

encephlopathy and leukemia/myelodysplastic syndrome.   Now resolved.  Patient's 

mentation is back to normal.





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


Severe neutropenia, anemia with hemoglobin 8.4, thrombocytopenia, hypokalemia, 

elevated liver enzymes


Vitamin B12 deficiency, borderline folate.





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  


Chronic neck pain


Chronic lower back pain


Chronic anemia


Hypertension


History of DVT on eliquis


COPD


History of hepatitis 


Nicotine use




















Plan: 





Continue Keppra to 750 mg every 12 hours.





Repeat EEG performed 07/12/2022 showed worsening of background, consistent with 

toxic metabolic encephalopathy.  No definitive epileptiform activity was seen.





Patient denies headaches.  Mentation back to normal.  No need for LP.  Neck is 

supple.





Repeat CT head with contrast from 07/10/2022 showed no acute process.  I 

personally reviewed CT head, agree with the findings.  Cannot obtain MRI of the 

brain since the patient has IVC filter.  Recommend MRI of the brain with and wit

hout as an outpatient that is a IVC filter compatible.


   


Oncology team is on board is seems the patient is scheduled for bone marrow 

biopsy this Tuesday


Patient is on fall precautions


On seizure precautions seizure pads.


Cardiology is consulted.


Patient was counseled on tobacco cessation.


Will defer the rest of medical management to the primary team.


Upon discharge, patient needs to follow-up with neurologist as outpatient within

1-2 weeks.

## 2022-07-14 LAB
ALBUMIN SERPL-MCNC: 3.3 G/DL (ref 3.5–5)
ALP SERPL-CCNC: 77 U/L (ref 38–126)
ALT SERPL-CCNC: 76 U/L (ref 4–49)
ANION GAP SERPL CALC-SCNC: 3 MMOL/L
AST SERPL-CCNC: 54 U/L (ref 17–59)
BLASTS # BLD MANUAL: 0.19 K/UL
BUN SERPL-SCNC: 14 MG/DL (ref 9–20)
CALCIUM SPEC-MCNC: 7.9 MG/DL (ref 8.4–10.2)
CELLS COUNTED: 100
CHLORIDE SERPL-SCNC: 110 MMOL/L (ref 98–107)
CO2 SERPL-SCNC: 24 MMOL/L (ref 22–30)
ERYTHROCYTE [DISTWIDTH] IN BLOOD BY AUTOMATED COUNT: 2.52 M/UL (ref 4.3–5.9)
ERYTHROCYTE [DISTWIDTH] IN BLOOD: 16.3 % (ref 11.5–15.5)
GLUCOSE SERPL-MCNC: 88 MG/DL (ref 74–99)
HCT VFR BLD AUTO: 29.1 % (ref 39–53)
HGB BLD-MCNC: 8.8 GM/DL (ref 13–17.5)
LDH SPEC-CCNC: 1052 U/L (ref 313–618)
LYMPHOCYTES # BLD MANUAL: 1.09 K/UL (ref 1–4.8)
MAGNESIUM SPEC-SCNC: 1.9 MG/DL (ref 1.6–2.3)
MCH RBC QN AUTO: 35 PG (ref 25–35)
MCHC RBC AUTO-ENTMCNC: 30.3 G/DL (ref 31–37)
MCV RBC AUTO: 115.5 FL (ref 80–100)
MONOCYTES # BLD MANUAL: 0.47 K/UL (ref 0–1)
NEUTROPHILS NFR BLD MANUAL: 43 %
NEUTS SEG # BLD MANUAL: 1.3 K/UL (ref 1.3–7.7)
PLATELET # BLD AUTO: 79 K/UL (ref 150–450)
POTASSIUM SERPL-SCNC: 4.1 MMOL/L (ref 3.5–5.1)
PROT SERPL-MCNC: 5.7 G/DL (ref 6.3–8.2)
SODIUM SERPL-SCNC: 137 MMOL/L (ref 137–145)
WBC # BLD AUTO: 3.1 K/UL (ref 3.8–10.6)

## 2022-07-14 RX ADMIN — CYANOCOBALAMIN SCH MCG: 1000 INJECTION, SOLUTION INTRAMUSCULAR; SUBCUTANEOUS at 08:14

## 2022-07-14 RX ADMIN — MORPHINE SULFATE PRN MG: 4 INJECTION, SOLUTION INTRAMUSCULAR; INTRAVENOUS at 19:58

## 2022-07-14 RX ADMIN — BUDESONIDE AND FORMOTEROL FUMARATE DIHYDRATE SCH: 80; 4.5 AEROSOL RESPIRATORY (INHALATION) at 20:43

## 2022-07-14 RX ADMIN — POTASSIUM CHLORIDE SCH MEQ: 20 TABLET, EXTENDED RELEASE ORAL at 08:14

## 2022-07-14 RX ADMIN — PANTOPRAZOLE SODIUM SCH MG: 40 INJECTION, POWDER, FOR SOLUTION INTRAVENOUS at 19:57

## 2022-07-14 RX ADMIN — MORPHINE SULFATE PRN MG: 4 INJECTION, SOLUTION INTRAMUSCULAR; INTRAVENOUS at 14:45

## 2022-07-14 RX ADMIN — IPRATROPIUM BROMIDE AND ALBUTEROL SULFATE SCH: .5; 3 SOLUTION RESPIRATORY (INHALATION) at 20:43

## 2022-07-14 RX ADMIN — PANTOPRAZOLE SODIUM SCH MG: 40 INJECTION, POWDER, FOR SOLUTION INTRAVENOUS at 08:14

## 2022-07-14 RX ADMIN — IPRATROPIUM BROMIDE AND ALBUTEROL SULFATE SCH ML: .5; 3 SOLUTION RESPIRATORY (INHALATION) at 15:43

## 2022-07-14 RX ADMIN — MONTELUKAST SODIUM SCH MG: 10 TABLET, FILM COATED ORAL at 19:57

## 2022-07-14 RX ADMIN — IPRATROPIUM BROMIDE AND ALBUTEROL SULFATE SCH: .5; 3 SOLUTION RESPIRATORY (INHALATION) at 07:23

## 2022-07-14 RX ADMIN — MORPHINE SULFATE PRN MG: 4 INJECTION, SOLUTION INTRAMUSCULAR; INTRAVENOUS at 08:17

## 2022-07-14 RX ADMIN — FOLIC ACID SCH MG: 1 TABLET ORAL at 08:14

## 2022-07-14 RX ADMIN — IPRATROPIUM BROMIDE AND ALBUTEROL SULFATE SCH: .5; 3 SOLUTION RESPIRATORY (INHALATION) at 11:35

## 2022-07-14 RX ADMIN — BUDESONIDE AND FORMOTEROL FUMARATE DIHYDRATE SCH: 80; 4.5 AEROSOL RESPIRATORY (INHALATION) at 07:23

## 2022-07-14 NOTE — P.PN
Subjective


Progress Note Date: 07/14/22


Principal diagnosis: 





Abnormal CBC





Awaiting results of bone marrow, he is feeling better walked to bathroom and to 

chair steady while in room this am. No CBC drawn today, will need daily please





Objective





- Vital Signs


Vital signs: 


                                   Vital Signs











Temp  98.3 F   07/14/22 08:09


 


Pulse  76   07/14/22 08:09


 


Resp  16   07/14/22 08:09


 


BP  134/81   07/14/22 08:09


 


Pulse Ox  95   07/14/22 08:09


 


FiO2  21   07/13/22 15:24








                                 Intake & Output











 07/13/22 07/14/22 07/14/22





 18:59 06:59 18:59


 


Intake Total 520  


 


Output Total 100 200 


 


Balance 420 -200 


 


Intake:   


 


    


 


  Oral 420  


 


Output:   


 


  Urine 100 200 


 


Other:   


 


  Voiding Method Urinal Urinal 














- Exam





- Constitutional


General appearance: Present: cooperative, no acute distress





- EENT


Eyes: Present: EOMI


ENT: Present: NA/AT





- Neck


Neck: Present: normal ROM





- Respiratory


Respiratory: bilateral: CTA





- Cardiovascular


Rhythm: regularly irregular





- Gastrointestinal


General gastrointestinal: Present: distended, soft





- Integumentary


Integumentary: Present: pale





- Neurologic


Neurologic: Present: CNII-XII intact





- Musculoskeletal


Musculoskeletal: Present: generalized weakness





- Psychiatric


Psychiatric: Present: A&O x's 3








- Labs


CBC & Chem 7: 


                                 07/13/22 13:41





                                 07/12/22 07:20


Labs: 


                  Abnormal Lab Results - Last 24 Hours (Table)











  07/13/22 Range/Units





  13:41 


 


WBC  3.2 L  (3.8-10.6)  k/uL


 


RBC  2.49 L  (4.30-5.90)  m/uL


 


Hgb  8.8 L  (13.0-17.5)  gm/dL


 


Hct  29.5 L  (39.0-53.0)  %


 


MCV  118.3 H  (80.0-100.0)  fL


 


MCH  35.5 H  (25.0-35.0)  pg


 


MCHC  30.0 L  (31.0-37.0)  g/dL


 


RDW  16.9 H  (11.5-15.5)  %


 


Plt Count  79 L  (150-450)  k/uL


 


Blast Cells %  7 H*  %


 


Myelocytes # (Manual)  0.03 H  (0)  k/uL


 


Blast Cells # (Man)  0.22 H  (0)  k/uL


 


Macrocytosis  Marked A  


 


Retic Count  2.7 H  (0.5-2.0)  %








                      Microbiology - Last 24 Hours (Table)











 07/08/22 17:01 Blood Culture - Preliminary





 Blood    No Growth after 120 hours


 


 07/08/22 16:47 Blood Culture - Preliminary





 Blood    No Growth after 120 hours














Assessment and Plan


(1) Abnormal complete blood count


Narrative/Plan: 


COncern for primary acute bone marrow discorder


 - Bone Marrow Biopsy scheduled for Tuesday


LDH Elevated, B12 and FOlate suboptimal and supplement ordered





Neutropenia, no growth factor with concern of primary bone marrow disorder





Bone marrow biopsy performed await results





Daily CBC and will transfuse Hg <7 Irradiated and Platelets less 10





NO ASA< NSAIS OR AC when platelets less than 50K todays pending








Current Visit: Yes   Status: Acute   Code(s): R79.89 - OTHER SPECIFIED ABNORMAL 

FINDINGS OF BLOOD CHEMISTRY   SNOMED Code(s): 231131953


   





(2) Macrocytic anemia


Narrative/Plan: 


macrocytosis


Current Visit: Yes   Status: Acute   Code(s): D53.9 - NUTRITIONAL ANEMIA, 

UNSPECIFIED   SNOMED Code(s): 35900409


   





(3) Thrombocytopenia


Narrative/Plan: 


79Kif drops below 50K, NO ASA, NSAIS or AC therapy


Monitor closely for bleeding


Current Visit: Yes   Status: Acute   Code(s): D69.6 - THROMBOCYTOPENIA, 

UNSPECIFIED   SNOMED Code(s): 737446138


   





(4) Hypokalemia


Current Visit: Yes   Status: Acute   Code(s): E87.6 - HYPOKALEMIA   SNOMED 

Code(s): 88178729


   


Plan: 





Status post bone marrow biopsy, will await results for more recommendations


Orders in


Monitor daily labs and for bleeding or infection in interim


CBC and CMP today ordered

## 2022-07-14 NOTE — P.PN
Subjective


Progress Note Date: 07/13/22


Principal diagnosis: 


Chronic hepatitis C





Patient is a 62-year-old  male with past medical history significant 

for COPD hypertension admitted to the hospital with lightheaded and follow-up 

also noticed to have elevated liver enzymes and a positive hepatitis C RNA.


On today's evaluation that is 07/13/2022, patient denies having any fever or 

chills, the patient is breathing comfortably none.  Chest pain shortness of 

breath or cough no abdominal pain or diarrhea








Objective





- Vital Signs


Vital signs: 


                                   Vital Signs











Temp  97.2 F L  07/13/22 08:00


 


Pulse  72   07/13/22 08:17


 


Resp  16   07/13/22 08:00


 


BP  112/57   07/13/22 08:00


 


Pulse Ox  93 L  07/13/22 08:00


 


FiO2      








                                 Intake & Output











 07/12/22 07/13/22 07/13/22





 18:59 06:59 18:59


 


Intake Total 358  


 


Output Total 125 437 100


 


Balance 233 -437 -100


 


Weight 121.563 kg  


 


Intake:   


 


  Oral 358  


 


Output:   


 


  Urine 125 200 100


 


  Post Void Residual  237 


 


Other:   


 


  Voiding Method Urinal Urinal Urinal


 


  # Bowel Movements 1  














- Exam


GENERAL DESCRIPTION: Middle-aged male lying in bed in no distress





RESPIRATORY SYSTEM: Unlabored breathing , decreased breath sounds at bases





HEART: S1 S2 regular rate and rhythm ,





ABDOMEN: Soft , no tenderness





EXTREMITIES: No edema feet








- Labs


CBC & Chem 7: 


                                 07/13/22 13:41





                                 07/12/22 07:20


Labs: 


                  Abnormal Lab Results - Last 24 Hours (Table)











  07/08/22 Range/Units





  17:01 


 


Hepatitis C RNA Quant  [id number] H  (0-0)  IU/mL








                      Microbiology - Last 24 Hours (Table)











 07/08/22 17:01 Blood Culture - Preliminary





 Blood    No Growth after 96 hours


 


 07/08/22 16:47 Blood Culture - Preliminary





 Blood    No Growth after 96 hours














Assessment and Plan


(1) Hepatitis C


Current Visit: Yes   Status: Acute   Code(s): B19.20 - UNSPECIFIED VIRAL 

HEPATITIS C WITHOUT HEPATIC COMA   SNOMED Code(s): 25451653


   


Plan: 


1patient with a positive hepatitis C RNA likely indicating of chronic hepatitis

C however the patient seem to be not aware of, however no evidence of any acute 

decompensation of his liver and treatment will be mostly outpatient.


2the natural course of chronic hepatitis C including progression to liver 

cirrhosis and hepatocellular carcinoma has been discussed with the patient and 

the patient seem to be interested in getting treatment.


3  HIV  testing and hepatitis C genotype are currently pending 


4-treatment will be mostly outpatient setting.

## 2022-07-14 NOTE — P.PN
Subjective


Progress Note Date: 07/14/22


62 -year-old male was admitted 7 days ago with generalized weakness and altered 

mentation.  He was also found to have significant anemia and thrombocytopenia.  

He had a bone marrow biopsy done yesterday, results are still pending.  He is 

being followed closely with hematology, he is also being evaluated by general 

surgery for possible GI bleed.  His hemoglobin yesterday was 8.8, platelets 

79,000, unfortunately no blood work was drawn this morning, repeat CBC and CMP 

is pending.  Patient is complaining of significant back pain from a bone biopsy,

but he is able to ambulate, there is no focal weakness or numbness, no loss of 

bowel or bladder function.  He has been afebrile, no shortness of breath, no 

chest pain, no dizziness.  His mentation appears to have returned to baseline 

with no new symptoms in the last 24 hours








Objective





- Vital Signs


Vital signs: 


                                   Vital Signs











Temp  98.3 F   07/14/22 08:09


 


Pulse  74   07/14/22 12:00


 


Resp  16   07/14/22 13:49


 


BP  118/58   07/14/22 12:00


 


Pulse Ox  97   07/14/22 12:00


 


FiO2  21   07/13/22 15:24








                                 Intake & Output











 07/13/22 07/14/22 07/14/22





 18:59 06:59 18:59


 


Intake Total 520  


 


Output Total 100 200 


 


Balance 420 -200 


 


Intake:   


 


    


 


  Oral 420  


 


Output:   


 


  Urine 100 200 


 


Other:   


 


  Voiding Method Urinal Urinal Urinal














- Exam








General: Awake alert and oriented


Head: atraumatic, normocephalic, symmetric


Eyes: Pupils equal round and reactive, extraocular muscles intact, anicteric 

sclera


Mouth: no lip lesion, mucus membranes moist


Cardiovascular: S1S2 reg rate and rhythm, no murmur, no gallop


Lungs: Bilateral equal air entry, no wheezing no rhonchi no crackles.


Abdominal: soft,  nontender to palpation, no guarding, no appreciable 

organomegaly


Ext: no gross muscle atrophy, no edema 


Neuro: Alert and oriented, no facial asymmetry, strength is equal in all muscle 

groups bilaterally, no tremor or seizure-like activity, no gross sensory 

deficits to light touch





- Labs


CBC & Chem 7: 


                                 07/13/22 13:41





                                 07/12/22 07:20


Labs: 


                  Abnormal Lab Results - Last 24 Hours (Table)











  07/13/22 Range/Units





  13:41 


 


Blast Cells %  7 H*  %


 


Myelocytes # (Manual)  0.03 H  (0)  k/uL


 


Blast Cells # (Man)  0.22 H  (0)  k/uL








                      Microbiology - Last 24 Hours (Table)











 07/08/22 17:01 Blood Culture - Preliminary





 Blood    No Growth after 120 hours


 


 07/08/22 16:47 Blood Culture - Preliminary





 Blood    No Growth after 120 hours














Assessment and Plan


Assessment: 





Macrocytic Anemia and thrombocytopenia


 abnormal  flow smear positive for blast cells along with other immature blood 

cells


Hemoglobin yesterday was stable at 8.8, platelets 70,000.  No blood work was on 

this morning, repeat CBC and CMP is pending


Status post bone marrow biopsy on 7/13/22.  Results are still pending


Appreciate further recommendations from hematology


General surgery was consulted for a scope but canceled the procedure pending 

bone marrow biopsy





Syncopal episode with Encephalopathy 


-Improving, rotation is almost back to baseline


CT negative, infectious workup negative, Abnormal EEG, seizures cannot be 

excluded, anemia workup as mentioned below


Patient started on Keppra by neurology


MRI couldnt be done per neurology as pt has an IVC filter 


Echocardiogram negative, plan for her 30 day event monitor with a cardiology f

ollow-up on discharge


Appreciated recommendations from Neurology 





Questionable pulmonary embolism on CTA


Continue Eliquis


further recommendations regarding anticoagulation from oncology pending








COPD, currently not in exacerbation 


Continue home inhalers


Initally was started on oral prednisone and then discontinued








Abnormal ultrasound liver


Liver ultrasound showing cholelithiasis, mild gallbladder wall thickening cons

istent with possible cholecystitis


HIDA negative 





Chronic active hepatitis C


-New diagnosis


-HIV test ordered by infectious disease


-Infectious disease following





Macrocytic anemia secondary to B12 deficiency


-Started on B12 replacement 1000 g IM daily for 3 days then weekly for 4 weeks 

then monthly





Chronic nicotine dependence


Nicotine patch as needed





Obesity BMI 37








DVT prophylaxis: SCDs


CODE STATUS: Full code


Discharge plan: home tomorrow








Time with Patient: Greater than 30

## 2022-07-15 VITALS
TEMPERATURE: 98.5 F | HEART RATE: 64 BPM | RESPIRATION RATE: 20 BRPM | DIASTOLIC BLOOD PRESSURE: 65 MMHG | SYSTOLIC BLOOD PRESSURE: 115 MMHG

## 2022-07-15 LAB
ALBUMIN SERPL-MCNC: 3.4 G/DL (ref 3.5–5)
ALP SERPL-CCNC: 90 U/L (ref 38–126)
ALT SERPL-CCNC: 77 U/L (ref 4–49)
ANION GAP SERPL CALC-SCNC: 4 MMOL/L
AST SERPL-CCNC: 54 U/L (ref 17–59)
BLASTS # BLD MANUAL: 0.18 K/UL
BUN SERPL-SCNC: 14 MG/DL (ref 9–20)
CALCIUM SPEC-MCNC: 7.8 MG/DL (ref 8.4–10.2)
CELLS COUNTED: 200
CHLORIDE SERPL-SCNC: 111 MMOL/L (ref 98–107)
CO2 SERPL-SCNC: 23 MMOL/L (ref 22–30)
EOSINOPHIL # BLD MANUAL: 0.09 K/UL (ref 0–0.7)
ERYTHROCYTE [DISTWIDTH] IN BLOOD BY AUTOMATED COUNT: 2.48 M/UL (ref 4.3–5.9)
ERYTHROCYTE [DISTWIDTH] IN BLOOD: 16.2 % (ref 11.5–15.5)
GLUCOSE SERPL-MCNC: 100 MG/DL (ref 74–99)
HCT VFR BLD AUTO: 28.5 % (ref 39–53)
HGB BLD-MCNC: 8.9 GM/DL (ref 13–17.5)
LDH SPEC-CCNC: 1036 U/L (ref 313–618)
LYMPHOCYTES # BLD MANUAL: 1.98 K/UL (ref 1–4.8)
MAGNESIUM SPEC-SCNC: 2 MG/DL (ref 1.6–2.3)
MCH RBC QN AUTO: 35.7 PG (ref 25–35)
MCHC RBC AUTO-ENTMCNC: 31 G/DL (ref 31–37)
MCV RBC AUTO: 115 FL (ref 80–100)
MONOCYTES # BLD MANUAL: 0.77 K/UL (ref 0–1)
MYELOCYTES # BLD MANUAL: 0.05 K/UL
NEUTROPHILS NFR BLD MANUAL: 34 %
NEUTS SEG # BLD MANUAL: 1.53 K/UL (ref 1.3–7.7)
PLATELET # BLD AUTO: 85 K/UL (ref 150–450)
POTASSIUM SERPL-SCNC: 4.1 MMOL/L (ref 3.5–5.1)
PROT SERPL-MCNC: 5.8 G/DL (ref 6.3–8.2)
SODIUM SERPL-SCNC: 138 MMOL/L (ref 137–145)
WBC # BLD AUTO: 4.5 K/UL (ref 3.8–10.6)

## 2022-07-15 RX ADMIN — PANTOPRAZOLE SODIUM SCH: 40 INJECTION, POWDER, FOR SOLUTION INTRAVENOUS at 07:32

## 2022-07-15 RX ADMIN — ACETAMINOPHEN PRN MG: 325 TABLET, FILM COATED ORAL at 07:43

## 2022-07-15 RX ADMIN — IPRATROPIUM BROMIDE AND ALBUTEROL SULFATE SCH: .5; 3 SOLUTION RESPIRATORY (INHALATION) at 11:20

## 2022-07-15 RX ADMIN — POTASSIUM CHLORIDE SCH MEQ: 20 TABLET, EXTENDED RELEASE ORAL at 07:44

## 2022-07-15 RX ADMIN — CYANOCOBALAMIN SCH MCG: 1000 INJECTION, SOLUTION INTRAMUSCULAR; SUBCUTANEOUS at 07:44

## 2022-07-15 RX ADMIN — MORPHINE SULFATE PRN MG: 4 INJECTION, SOLUTION INTRAMUSCULAR; INTRAVENOUS at 03:56

## 2022-07-15 RX ADMIN — IPRATROPIUM BROMIDE AND ALBUTEROL SULFATE SCH: .5; 3 SOLUTION RESPIRATORY (INHALATION) at 07:53

## 2022-07-15 RX ADMIN — BUDESONIDE AND FORMOTEROL FUMARATE DIHYDRATE SCH: 80; 4.5 AEROSOL RESPIRATORY (INHALATION) at 07:53

## 2022-07-15 RX ADMIN — FOLIC ACID SCH MG: 1 TABLET ORAL at 07:44

## 2022-07-15 RX ADMIN — HYDROCODONE BITARTRATE AND ACETAMINOPHEN PRN EACH: 5; 325 TABLET ORAL at 04:08

## 2022-07-15 RX ADMIN — ACETAMINOPHEN PRN MG: 325 TABLET, FILM COATED ORAL at 13:17

## 2022-07-15 NOTE — P.PN
Subjective


Progress Note Date: 07/14/22


Principal diagnosis: 


Chronic hepatitis C





Patient is a 62-year-old  male with past medical history significant 

for COPD hypertension admitted to the hospital with lightheaded and follow-up 

also noticed to have elevated liver enzymes and a positive hepatitis C RNA.


On today's evaluation that is 07/14/2022, patient remains to be afebrile, the 

patient is breathing comfortably on nasal cannula oxygen, the patient denies  

Chest pain shortness of breath or cough no abdominal pain or diarrhea








Objective





- Vital Signs


Vital signs: 


                                   Vital Signs











Temp  98.3 F   07/14/22 08:09


 


Pulse  76   07/14/22 08:09


 


Resp  16   07/14/22 08:09


 


BP  134/81   07/14/22 08:09


 


Pulse Ox  95   07/14/22 08:09


 


FiO2  21   07/13/22 15:24








                                 Intake & Output











 07/13/22 07/14/22 07/14/22





 18:59 06:59 18:59


 


Intake Total 520  


 


Output Total 100 200 


 


Balance 420 -200 


 


Intake:   


 


    


 


  Oral 420  


 


Output:   


 


  Urine 100 200 


 


Other:   


 


  Voiding Method Urinal Urinal Urinal














- Exam


GENERAL DESCRIPTION: Middle-aged male lying in bed in no distress





RESPIRATORY SYSTEM: Unlabored breathing , decreased breath sounds at bases





HEART: S1 S2 regular rate and rhythm ,





ABDOMEN: Soft , no tenderness





EXTREMITIES: No edema feet








- Labs


CBC & Chem 7: 


                                 07/15/22 06:59





                                 07/15/22 06:59


Labs: 


                  Abnormal Lab Results - Last 24 Hours (Table)











  07/13/22 Range/Units





  13:41 


 


WBC  3.2 L  (3.8-10.6)  k/uL


 


RBC  2.49 L  (4.30-5.90)  m/uL


 


Hgb  8.8 L  (13.0-17.5)  gm/dL


 


Hct  29.5 L  (39.0-53.0)  %


 


MCV  118.3 H  (80.0-100.0)  fL


 


MCH  35.5 H  (25.0-35.0)  pg


 


MCHC  30.0 L  (31.0-37.0)  g/dL


 


RDW  16.9 H  (11.5-15.5)  %


 


Plt Count  79 L  (150-450)  k/uL


 


Blast Cells %  7 H*  %


 


Myelocytes # (Manual)  0.03 H  (0)  k/uL


 


Blast Cells # (Man)  0.22 H  (0)  k/uL


 


Macrocytosis  Marked A  


 


Retic Count  2.7 H  (0.5-2.0)  %








                      Microbiology - Last 24 Hours (Table)











 07/08/22 17:01 Blood Culture - Preliminary





 Blood    No Growth after 120 hours


 


 07/08/22 16:47 Blood Culture - Preliminary





 Blood    No Growth after 120 hours














Assessment and Plan


(1) Hepatitis C


Status: Acute   Code(s): B19.20 - UNSPECIFIED VIRAL HEPATITIS C WITHOUT HEPATIC 

COMA   SNOMED Code(s): 43231915


   


Plan: 


1patient with a positive hepatitis C RNA likely indicating of chronic hepatitis

C however the patient seem to be not aware of, however no evidence of any acute 

decompensation of his liver and treatment will be mostly outpatient.


2the natural course of chronic hepatitis C including progression to liver 

cirrhosis and hepatocellular carcinoma has been discussed with the patient and 

the patient seem to be interested in getting treatment.


3  HIV  testing and hepatitis C genotype has been ordered and currently pending




4-treatment will be started in the outpatient setting.


 





Time with Patient: Less than 30

## 2022-07-15 NOTE — P.PN
Subjective


Progress Note Date: 07/15/22


Principal diagnosis: 


Chronic hepatitis C





Patient is a 62-year-old  male with past medical history significant 

for COPD hypertension admitted to the hospital with lightheaded and follow-up 

also noticed to have elevated liver enzymes and a positive hepatitis C RNA.


On today's evaluation that is 07/15/2022, patient denies having any fever or any

chills, the patient is breathing comfortably on nasal cannula oxygen, the 

patient denies Chest pain shortness of breath or cough , the patient denies 

abdominal pain or diarrhea








Objective





- Vital Signs


Vital signs: 


                                   Vital Signs











Temp  98.7 F   07/15/22 07:37


 


Pulse  65   07/15/22 11:02


 


Resp  22   07/15/22 11:02


 


BP  119/59   07/15/22 11:02


 


Pulse Ox  96   07/15/22 11:02


 


FiO2  21   07/13/22 15:24








                                 Intake & Output











 07/14/22 07/15/22 07/15/22





 18:59 06:59 18:59


 


Intake Total 240  240


 


Balance 240  240


 


Weight   121.563 kg


 


Intake:   


 


  Oral 240  240


 


Other:   


 


  Voiding Method Urinal Urinal Urinal


 


  # Voids 5 1 














- Exam


GENERAL DESCRIPTION: Middle-aged male lying in bed in no distress





RESPIRATORY SYSTEM: Unlabored breathing , decreased breath sounds at bases





HEART: S1 S2 regular rate and rhythm ,





ABDOMEN: Soft , no tenderness





EXTREMITIES: No edema feet








- Labs


CBC & Chem 7: 


                                 07/15/22 06:59





                                 07/15/22 06:59


Labs: 


                  Abnormal Lab Results - Last 24 Hours (Table)











  07/14/22 07/14/22 07/15/22 Range/Units





  15:36 15:36 06:59 


 


WBC  3.1 L    (3.8-10.6)  k/uL


 


RBC  2.52 L   2.48 L  (4.30-5.90)  m/uL


 


Hgb  8.8 L   8.9 L  (13.0-17.5)  gm/dL


 


Hct  29.1 L   28.5 L  (39.0-53.0)  %


 


MCV  115.5 H   115.0 H  (80.0-100.0)  fL


 


MCH    35.7 H  (25.0-35.0)  pg


 


MCHC  30.3 L    (31.0-37.0)  g/dL


 


RDW  16.3 H   16.2 H  (11.5-15.5)  %


 


Plt Count  79 L    (150-450)  k/uL


 


Blast Cells %  6 H*    %


 


Blast Cells # (Man)  0.19 H    (0)  k/uL


 


Macrocytosis  Marked A   Marked A  


 


Chloride   110 H   ()  mmol/L


 


Glucose     (74-99)  mg/dL


 


Calcium   7.9 L   (8.4-10.2)  mg/dL


 


ALT   76 H   (4-49)  U/L


 


Lactate Dehydrogenase   1052 H   (313-618)  U/L


 


Total Protein   5.7 L   (6.3-8.2)  g/dL


 


Albumin   3.3 L   (3.5-5.0)  g/dL














  07/15/22 Range/Units





  06:59 


 


WBC   (3.8-10.6)  k/uL


 


RBC   (4.30-5.90)  m/uL


 


Hgb   (13.0-17.5)  gm/dL


 


Hct   (39.0-53.0)  %


 


MCV   (80.0-100.0)  fL


 


MCH   (25.0-35.0)  pg


 


MCHC   (31.0-37.0)  g/dL


 


RDW   (11.5-15.5)  %


 


Plt Count   (150-450)  k/uL


 


Blast Cells %   %


 


Blast Cells # (Man)   (0)  k/uL


 


Macrocytosis   


 


Chloride  111 H  ()  mmol/L


 


Glucose  100 H  (74-99)  mg/dL


 


Calcium  7.8 L  (8.4-10.2)  mg/dL


 


ALT  77 H  (4-49)  U/L


 


Lactate Dehydrogenase  1036 H  (313-618)  U/L


 


Total Protein  5.8 L  (6.3-8.2)  g/dL


 


Albumin  3.4 L  (3.5-5.0)  g/dL








                      Microbiology - Last 24 Hours (Table)











 07/08/22 16:47 Blood Culture - Final





 Blood    No Growth after 144 hours


 


 07/08/22 17:01 Blood Culture - Final





 Blood    No Growth after 144 hours














Assessment and Plan


(1) Hepatitis C


Status: Acute   Code(s): B19.20 - UNSPECIFIED VIRAL HEPATITIS C WITHOUT HEPATIC 

COMA   SNOMED Code(s): 72183905


   


Plan: 


1patient with a positive hepatitis C RNA likely indicating of chronic hepatitis

C however the patient seem to be not aware of, however no evidence of any acute 

decompensation of his liver and treatment will be mostly outpatient.


2the natural course of chronic hepatitis C including progression to liver 

cirrhosis and hepatocellular carcinoma has been discussed with the patient and 

the patient seem to be interested in getting treatment.


3  HIV  testing came back negative and hepatitis C genotype is currently 

pending 


Patient has been advised to follow-up outpatient setting for further management 

of his hepatitis C.


 





Time with Patient: Less than 30

## 2022-07-16 NOTE — P.DS
Providers


Date of admission: 


07/07/22 20:38





Expected date of discharge: 07/15/22


Attending physician: 


Alek Bynum MD





Consults: 





                                        





07/08/22 03:51


Consult Physician Urgent 


   Consulting Provider: John Cavanaugh


   Consult Reason/Comments: Syncope w/ possible seizure


   Do you want consulting provider notified?: Yes





07/08/22 16:18


Consult Physician Routine 


   Consulting Provider: Rolando Mendoza


   Consult Reason/Comments: myelopdysplasic syndrome


   Do you want consulting provider notified?: Yes





07/12/22 11:26


Consult Physician Routine 


   Consulting Provider: Magda Fairchild


   Consult Reason/Comments: hep C positive


   Do you want consulting provider notified?: Yes











Primary care physician: 


Physician Nonstaff





Hospital Course: 


HPI from admission H&P:





The patient is a 62-year-old male with a PMH of hypertension, chronic anemia, 

COPD, chronic lower extremity edema, history of DVT on Eliquis, who presents to 

the emergency room for multiple episodes of loss of consciousness.  The patient 

reports that he had been in his usual state of health until about 5 days ago 

when he began losing consciousness without any clear warning.  Reports it 

happened a total of 4 times in the past 5 days.  Each time, he would lose 

consciousness while sitting down or standing up, without any prodromal symptoms.

 He reports waking up on the floor each time, informed by bystanders that he was

confused and having urinary incontinence during 2 of these episodes.  He also 

reports biting his tongue during the episode earlier today.  Patient reports 

that today, he was with his friends in town and they had gone into a store, at 

which point he decided to go to the store as well, and as he got out of the car,

he was walking in the parking lot and the next thing he knows he was on the 

ground with people surrounding him.  He reports pain at the back of his head 

from the fall.  Does endorse experiencing some chest discomfort and shortness of

breath, left-sided, nonradiating shortly after regaining consciousness, lasting 

for a few minutes.  No reported shaking movements of the limbs.  He denies any 

prior history of such symptoms.  Reports that the symptoms are not positional, 

and he has no dizziness or lightheadedness upon standing.  Denies any changes in

his appetite recently.  Denied fever, chills, abdominal pain, diarrhea.  Reports

chronic nonproductive cough which he attributes to his COPD.  Head and cervical 

spine CT was unremarkable.  Chest CTA revealed groundglass upper lobe density, 

possible acute inflammatory process.  EKG revealed a junctional rhythm at 85 bpm

with poor R-wave progression.  Laboratory evaluation was remarkable for 

hemoglobin 8.5, .8, platelet count 74, d-dimer 3.9, potassium 2.8, CO2 

31, BUN 39, creatinine 1.24, troponin less than 0.012.





Hospital course:


Patient was initially admitted with treatment and evaluation of syncope as well 

as what appeared to be seizure-like activity.  He was also found to have 

significant anemia and thrombocytopenia.  Neurology and hematology were 

consulted from day one.  Head CT did not show any acute findings, patient was 

not able to have an MRI done as he has an IVC filter which is incompatible.  EEG

showed rare epileptiform discharges in the frontotemporal region.  He was 

started on Keppra 1 g twice daily, repeat EEG was performed which showed 

improvement.  His mentation slowly improved over the following few days and had 

returned back to normal baseline at time of discharge.  He was given a 

prescription for an MRI with and without contrast of the brain to be done at a 

facility that has been MRI compatible with his IVC filter.  His hemoglobin and 

platelet level remained low but stabilized around 8.5 and 70,000 respectively.  

He had a bone marrow biopsy done on 07/13/2022, pathology results were still 

pending at the time of discharge.  He has a history of questionable pulmonary 

emboli, Eliquis was discontinued due to his anemia and thrombocytopenia.  The 

pros and cons of restarting anticoagulation can be reconsidered in the following

1-2 weeks once the patient follows up with hematology regarding his bone marrow 

biopsy results.  During his hospitalization he was also found to have elevated 

liver enzymes and bilirubin, abdominal ultrasound revealed cholelithiasis, HIDA 

scan was negative for cholecystitis.  Patient was also diagnosed with chronic 

hepatitis C this admission and will need follow-up with infectious disease.  He 

was eventually discharged home in stable condition after 1 week of hospital.  He

will need close follow-up with his PCP, neurology regarding his seizures, 

hematology to follow up with pancytopenia and bone marrow biopsy results, and 

infectious disease regarding new diagnosis of hepatitis C


Assessment: 


General: Awake alert and oriented


Head: atraumatic, normocephalic, symmetric


Eyes: Pupils equal round and reactive, extraocular muscles intact, anicteric 

sclera


Mouth: no lip lesion, mucus membranes moist


Cardiovascular: S1S2 reg rate and rhythm, no murmur, no gallop


Lungs: Bilateral equal air entry, no wheezing no rhonchi no crackles.


Abdominal: soft,  nontender to palpation, no guarding, no appreciable 

organomegaly


Ext: no gross muscle atrophy, no edema 


Neuro: Alert and oriented, no facial asymmetry, strength is equal in all muscle 

groups bilaterally, no tremor or seizure-like activity, no gross sensory 

deficits to light touch


Patient Condition at Discharge: Good





Plan - Discharge Summary


Discharge Rx Participant: Yes


New Discharge Prescriptions: 


New


   Folic Acid 1 mg PO DAILY 30 Days #30 tab


   levETIRAcetam [Keppra] 500 mg PO Q12HR 30 Days #60 tab


   HYDROcodone/APAP 7.5-325MG [Norco 7.5-325] 1 each PO Q8H PRN 14 Days #42 tab


     PRN Reason: Pain





Continue


   Pantoprazole [Protonix] 40 mg PO DAILY


   Naloxone HCl [Narcan] 4 mg NASAL ONCE PRN


     PRN Reason: overdose


   Mupirocin 2% Oint [Bactroban 2% Oint] 1 applic TOPICAL DAILY


   hydroCHLOROthiazide [Hydrodiuril] 25 mg PO DAILY


   Furosemide [Lasix] 20 mg PO HS


   Baclofen 10 mg PO BID


   ALPRAZolam [Xanax] 1 mg PO TID


   Fluticasone/Umeclidin/Vilanter [Trelegy Ellipta 100-62.5-25] 1 puff 

INHALATION RT-DAILY


   Promethazine HCl [Phenergan Syrup] 6.25 mg PO Q6H PRN


     PRN Reason: Cough


   Pregabalin [Lyrica] 150 mg PO BID


   Potassium Chloride ER [K-Dur 20] 20 meq PO DAILY


   Montelukast [Singulair] 10 mg PO HS


   Mirtazapine 30 mg PO HS


   lisinopriL [Prinivil] 20 mg PO DAILY


   Albuterol Inhaler [Ventolin Hfa Inhaler] 2 puff INHALATION RT-QID PRN


     PRN Reason: Shortness Of Breath





Discontinued


   oxyCODONE-APAP 10-325MG [Percocet  mg] 1 tab PO TID


   Furosemide [Lasix] 40 mg PO DAILY


   Apixaban [Eliquis] 5 mg PO BID


Discharge Medication List





ALPRAZolam [Xanax] 1 mg PO TID 07/07/22 [History]


Albuterol Inhaler [Ventolin Hfa Inhaler] 2 puff INHALATION RT-QID PRN 07/07/22 

[History]


Baclofen 10 mg PO BID 07/07/22 [History]


Fluticasone/Umeclidin/Vilanter [Trelegy Ellipta 100-62.5-25] 1 puff INHALATION 

RT-DAILY 07/07/22 [History]


Furosemide [Lasix] 20 mg PO HS 07/07/22 [History]


Mirtazapine 30 mg PO HS 07/07/22 [History]


Montelukast [Singulair] 10 mg PO HS 07/07/22 [History]


Mupirocin 2% Oint [Bactroban 2% Oint] 1 applic TOPICAL DAILY 07/07/22 [History]


Naloxone HCl [Narcan] 4 mg NASAL ONCE PRN 07/07/22 [History]


Pantoprazole [Protonix] 40 mg PO DAILY 07/07/22 [History]


Potassium Chloride ER [K-Dur 20] 20 meq PO DAILY 07/07/22 [History]


Pregabalin [Lyrica] 150 mg PO BID 07/07/22 [History]


Promethazine HCl [Phenergan Syrup] 6.25 mg PO Q6H PRN 07/07/22 [History]


hydroCHLOROthiazide [Hydrodiuril] 25 mg PO DAILY 07/07/22 [History]


lisinopriL [Prinivil] 20 mg PO DAILY 07/07/22 [History]


Folic Acid 1 mg PO DAILY 30 Days #30 tab 07/15/22 [Rx]


HYDROcodone/APAP 7.5-325MG [Norco 7.5-325] 1 each PO Q8H PRN 14 Days #42 tab 

07/15/22 [Rx]


levETIRAcetam [Keppra] 500 mg PO Q12HR 30 Days #60 tab 07/15/22 [Rx]








Follow up Appointment(s)/Referral(s): 


Rolando Mendoza MD [STAFF PHYSICIAN] - 1 Week (Office to call patient with 

appointment.)


Rodríguez Vasquez DO [STAFF PHYSICIAN] - 08/03/22 2:45 pm


Nonstaff,Physician [Primary Care Provider] - 1-2 days (Please find/choose a 

primary care provider and schedule an appointment as soon as possible.)


Gurinder,Sajjad, MD [STAFF PHYSICIAN] - 3 Weeks (Office closed--patient to make 

appointment.)


Ambulatory/Diagnostic Orders: 


Miscellaneous Radiology Order [RAD.AMB] Time Frame: 2 Weeks, Location: None 

Selected


Patient Instructions/Handouts:  Syncope (DC), Anemia (DC)


Discharge Disposition: HOME SELF-CARE

## 2022-07-18 LAB
HCV RNA SERPL NAA+PROBE-LOG IU: 5.66 {LOG_IU}/ML (ref ?–1.08)
HCV RNA SERPL QL NAA+PROBE: DETECTED

## 2022-07-18 NOTE — P.PN
Subjective


Progress Note Date: 07/14/22 07/14/2022: Patient was seen for a follow-up.  Patient complains of low back 

pain.  Patient has not had any more seizure-like spells.  He wants to continue 

Keppra.





07/13/2022: Patient was seen for a follow-up.  Patient denies headache at this 

time.  Patient states that he did have headache earlier.  No dizziness, no 

passing out.  No seizures.  Patient's telemetry monitoring showing sinus rhythm 

in the 77 range.  No new focal symptoms.





07/12/2022: Patient was seen for a follow-up.  Patient has clinically remarkably

improved.  Patient able to answer appropriately.  Patient states that he never 

has any history of seizures in his whole life.  Patient states that his seizure 

type spells "came out of nowhere".  He says that he remembers that he passed out

4 times, each time he was standing up on his feet for some time before he passed

out (not merely occurring on standing up).  He became lightheaded, started 

shaking and passed out.  He did bite his tongue with the first syncopal 

spells/seizure and lost control of urine with others.  He passed out 4 times in 

4 days.  He states that he is not sure how long he was out a standing, but 

perhaps for "5 minutes".  Patient at present denies headache, no dizziness.  No 

problem with the vision.  His telemetry monitoring showing sinus rhythm between 

40s and 110s.  Some PVCs and PACs.





Patient states that his father and paternal grandfather also has history of 

epilepsy.





07/11/2022: Patient initially seen by Dr. John Cavanaugh.  Please refer to his note 

for details.  Patient is a 62-year-old male having syncopal episode and 

clinically seizure-like activity.  The initial EEG read by Dr. Cavanaugh, who 

noticed there was rare epileptiform discharges frontotemporal central.  He was 

started on Keppra 1 g twice a day.  Repeat EEG was performed today.  Patient 

also has possible myelodysplastic syndrome, newly diagnosed and getting workup.





Patient at present complaining of headache, which he admitted that is severe.  

Per nursing report, patient has been thrashing, yelling, screaming, difficult to

manage.  He is abusing words.  He is trying to climb out of bed, fall risk.  Ple

ase refer to examination below.  Nursing staff has not noticed any seizure-like 

activity, only aggression, agitation.





Objective





- Vital Signs


Vital signs: 


                                   Vital Signs











Temp  98.3 F   07/14/22 08:09


 


Pulse  64   07/14/22 14:49


 


Resp  16   07/14/22 14:49


 


BP  112/57   07/14/22 14:49


 


Pulse Ox  97   07/14/22 14:49


 


FiO2  21   07/13/22 15:24








                                 Intake & Output











 07/13/22 07/14/22 07/14/22





 18:59 06:59 18:59


 


Intake Total 520  


 


Output Total 100 200 


 


Balance 420 -200 


 


Intake:   


 


    


 


  Oral 420  


 


Output:   


 


  Urine 100 200 


 


Other:   


 


  Voiding Method Urinal Urinal Urinal














- Exam





On examination patient is a late middle aged  male, who appears older 

than his stated age.  He is fully alert and awake today.  He is answering 

appropriately. 





Patient knows it is July 2022 and that he is in [facility] in [facility] and name of the current president. 





Speech and language functions are normal.  He can name and repeat very well.  No

aphasia or dysarthria.  On cranial examination, pupils are equal, round and 

reacting, visual fields are full on confrontation with no neglect, extraocular 

muscles are intact, face appears symmetric.  On muscle strength testing there is

no pronator drift and the strength is normal in arms and legs distally and 

proximally except hip flexion which is 4to4- bilaterally. 





Reflexes are symmetric 2+ at the biceps, 2+ brachioradialis, 3 at the knees, 2+ 

ankles and plantars downgoing bilaterally.  Tone and bulk of muscles normal.





No ataxia for finger-to-nose or heel-to-shin testing bilaterally.





Sensory to touch is decreased in the right arm and right leg, but is decreased 

on the left side of the face as compared to the right side.





Gait deferred.





Patient has ecchymosis on his skin.  Some peripheral edema.  Abdomen appears 

soft and nontender.  





- Labs


CBC & Chem 7: 


                                 07/15/22 06:59





                                 07/15/22 06:59


Labs: 


                      Microbiology - Last 24 Hours (Table)











 07/08/22 17:01 Blood Culture - Preliminary





 Blood    No Growth after 120 hours


 


 07/08/22 16:47 Blood Culture - Preliminary





 Blood    No Growth after 120 hours














Assessment and Plan


Assessment: 





Recurrent Syncopal-like episodes for past 5 days prior to admission, seems like 

convulsive syncope, less likely seizures, as all 4 of them occurred while he was

standing up on his feet for some time before he had a presyncopal spells and 

then passed out.  However he did have urinary incontinence, tongue bite and some

episodes shaking of uppers, raising concern for seizure. 


EEG reported by Dr. John Cavanaugh as  questionable rare fronto-temporal central 

epileptiform discharges.  No seizures.  Repeat EEG negative for any epileptiform

activity, only background slowing.


Encephalopathy due to multifactorial: likely related to syncope, metabolic encep

hlopathy and leukemia/myelodysplastic syndrome.   Encephalopathy now resolved.  

Patient's mentation is back to normal.





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


Severe neutropenia, anemia with hemoglobin 8.4, thrombocytopenia, hypokalemia, 

elevated liver enzymes


Vitamin B12 deficiency, borderline folate.





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  


Chronic neck pain


Chronic lower back pain


Chronic anemia


Hypertension


History of DVT on eliquis


COPD


History of hepatitis 


Nicotine use




















Plan: 





We discussed about stopping Keppra gradually.  Patient wants to continue Keppra 

to prevent ?seizures.  It is still not very clear if patient has seizures or 

convulsive syncope.  We wishes to continue Keppra to 500 mg every 12 hours.





Patient had undergone bone marrow biopsy yesterday, results pending.  Oncology 

following.  HIV testing came back negative.  Patient has elevated LDH, 1052, 

elevated ALT 76, normal AST 54.  Renal functions are normal.  Electrolytes 

normal.





Repeat EEG performed 07/12/2022 showed worsening of background, consistent with 

toxic metabolic encephalopathy.  No definitive epileptiform activity was seen.





Patient denies headaches.  Mentation back to normal.  





Repeat CT head with contrast from 07/10/2022 showed no acute process.  I 

personally reviewed CT head, agree with the findings.  Cannot obtain MRI of the 

brain since the patient has IVC filter.  Recommend MRI of the brain with and rosalia nam as an outpatient that is a IVC filter compatible.


  


Patient was counseled on tobacco cessation.


Will defer the rest of medical management to the primary team.


Neurologically clear.  Upon discharge, patient needs to follow-up with 

neurologist as outpatient within 1-2 weeks.  Patient may need prolonged EEG 

monitoring to evaluate for epileptiform activity.  If negative, then Keppra 

could be potentially tapered off.  However I would leave it up to the new 

neurologist that he will follow in the office.


Patient informed of [place] state law of no driving unless seizure free for 6 

months, climbing ladders, operate dangerous machinery or unsupervised swimming.